# Patient Record
Sex: FEMALE | Race: WHITE | Employment: OTHER | URBAN - METROPOLITAN AREA
[De-identification: names, ages, dates, MRNs, and addresses within clinical notes are randomized per-mention and may not be internally consistent; named-entity substitution may affect disease eponyms.]

---

## 2017-04-12 ENCOUNTER — HOSPITAL ENCOUNTER (OUTPATIENT)
Dept: WOMENS IMAGING | Age: 72
Discharge: HOME | End: 2017-04-12
Payer: MEDICARE

## 2017-04-12 DIAGNOSIS — Z13.9 SCREENING: ICD-10-CM

## 2017-04-12 PROCEDURE — 77063 BREAST TOMOSYNTHESIS BI: CPT

## 2017-12-19 ENCOUNTER — HOSPITAL ENCOUNTER (OUTPATIENT)
Age: 72
Setting detail: SPECIMEN
Discharge: HOME | End: 2017-12-19
Payer: MEDICARE

## 2017-12-26 LAB — SURGICAL PATHOLOGY REPORT: NORMAL

## 2018-07-06 ENCOUNTER — HOSPITAL ENCOUNTER (OUTPATIENT)
Dept: WOMENS IMAGING | Age: 73
Discharge: HOME | End: 2018-07-08
Payer: MEDICARE

## 2018-07-06 DIAGNOSIS — Z78.0 POST-MENOPAUSAL: ICD-10-CM

## 2018-07-06 DIAGNOSIS — M81.0 OSTEOPOROSIS, UNSPECIFIED OSTEOPOROSIS TYPE, UNSPECIFIED PATHOLOGICAL FRACTURE PRESENCE: ICD-10-CM

## 2018-07-06 DIAGNOSIS — Z13.9 ENCOUNTER FOR SCREENING: ICD-10-CM

## 2018-07-06 PROCEDURE — 77080 DXA BONE DENSITY AXIAL: CPT

## 2018-07-06 PROCEDURE — 77063 BREAST TOMOSYNTHESIS BI: CPT

## 2019-05-22 ENCOUNTER — OFFICE VISIT (OUTPATIENT)
Dept: ORTHOPEDIC SURGERY | Age: 74
End: 2019-05-22
Payer: MEDICARE

## 2019-05-22 DIAGNOSIS — G89.29 CHRONIC PAIN OF RIGHT KNEE: Primary | ICD-10-CM

## 2019-05-22 DIAGNOSIS — M25.561 CHRONIC PAIN OF RIGHT KNEE: Primary | ICD-10-CM

## 2019-05-22 PROCEDURE — G8421 BMI NOT CALCULATED: HCPCS | Performed by: ORTHOPAEDIC SURGERY

## 2019-05-22 PROCEDURE — 1036F TOBACCO NON-USER: CPT | Performed by: ORTHOPAEDIC SURGERY

## 2019-05-22 PROCEDURE — G8427 DOCREV CUR MEDS BY ELIG CLIN: HCPCS | Performed by: ORTHOPAEDIC SURGERY

## 2019-05-22 PROCEDURE — 20610 DRAIN/INJ JOINT/BURSA W/O US: CPT | Performed by: ORTHOPAEDIC SURGERY

## 2019-05-22 PROCEDURE — 1123F ACP DISCUSS/DSCN MKR DOCD: CPT | Performed by: ORTHOPAEDIC SURGERY

## 2019-05-22 PROCEDURE — 4040F PNEUMOC VAC/ADMIN/RCVD: CPT | Performed by: ORTHOPAEDIC SURGERY

## 2019-05-22 PROCEDURE — 99213 OFFICE O/P EST LOW 20 MIN: CPT | Performed by: ORTHOPAEDIC SURGERY

## 2019-05-22 PROCEDURE — G8399 PT W/DXA RESULTS DOCUMENT: HCPCS | Performed by: ORTHOPAEDIC SURGERY

## 2019-05-22 PROCEDURE — 1090F PRES/ABSN URINE INCON ASSESS: CPT | Performed by: ORTHOPAEDIC SURGERY

## 2019-05-22 RX ORDER — LIDOCAINE HYDROCHLORIDE 10 MG/ML
5 INJECTION, SOLUTION INFILTRATION; PERINEURAL ONCE
Status: COMPLETED | OUTPATIENT
Start: 2019-05-22 | End: 2019-05-22

## 2019-05-22 RX ORDER — COVID-19 ANTIGEN TEST
KIT MISCELLANEOUS PRN
Status: ON HOLD | COMMUNITY
End: 2020-09-15 | Stop reason: HOSPADM

## 2019-05-22 RX ORDER — BUPIVACAINE HYDROCHLORIDE 5 MG/ML
2 INJECTION, SOLUTION PERINEURAL ONCE
Status: COMPLETED | OUTPATIENT
Start: 2019-05-22 | End: 2019-05-22

## 2019-05-22 RX ADMIN — BUPIVACAINE HYDROCHLORIDE 10 MG: 5 INJECTION, SOLUTION PERINEURAL at 15:54

## 2019-05-22 RX ADMIN — LIDOCAINE HYDROCHLORIDE 5 ML: 10 INJECTION, SOLUTION INFILTRATION; PERINEURAL at 15:55

## 2019-05-22 NOTE — PROGRESS NOTES
Estefani Andrews M.D.            [address] [facility] [address] [facility], [address] [facility]             Dept Phone: [phone]             Dept Fax:  [phone]  Gerald Son returns today. She is status post left total knee. She says her left knee is doing great she states her right knee is really beginning to bother her. She's had injections in the past.  She states that she ordinarily be ready do an arthroplasty and this site but she is heading the [facility] soon to see her sister who was recently diagnosed with cancer. She would prefer to put off her arthroplasty to later time. Examination patient's left total knee notes her motion is 0-1 35. She has excellent stability throughout excellent tracking throughout    Examination of her right knee notes about a 5° flexion contracture. Flexion to about 120. Crepitus and grinding medially as well as patella femoral joint ligamentously she is grossly intact. XR taken today:  Xr Knee Right (1-2 Views)    Result Date: 5/22/2019  X-rays taken today reviewed by me show standing AP of both knees and a lateral the right knee. Patient's left total knee looks in excellent position. Her right total knee has bone-on-bone apposition as well as severe patellofemoral narrowing     Impression  Status post left total knee  Moderate to severe DJD right knee    Plan  Per patient's request the right knee was injected with lidocaine superficially and then Khushi Britton to follow. She tolerated procedure well. We'll see her back here in 4 months. Review of Systems   Constitutional: Negative. HENT: Negative. Respiratory: Negative. Cardiovascular: Negative. Neurological: Negative. Musculoskeletal:   No chief complaint on file.           Current Outpatient Medications:     oxyCODONE-acetaminophen (PERCOCET) 5-325 MG per tablet, Take 1-2 tablets by mouth every 4 hours as needed for Pain (every 4-6 hours), Disp: 40 tablet, Rfl: 0    amoxicillin (AMOXIL) 500 MG capsule, 2 capsules one hour prior to dental or procedure flakito't then one cap twice daily until gone, Disp: 6 capsule, Rfl: 3    oxyCODONE-acetaminophen (PERCOCET) 5-325 MG per tablet, Take 1-2 tablets by mouth every 4 hours as needed for Pain (every 4-6 hours), Disp: 40 tablet, Rfl: 0    simvastatin (ZOCOR) 40 MG tablet, Take 40 mg by mouth nightly., Disp: , Rfl:     celecoxib (CELEBREX) 200 MG capsule, Take 200 mg by mouth 2 times daily. , Disp: , Rfl:     alendronate (FOSAMAX) 70 MG tablet, Take 70 mg by mouth every 7 days. , Disp: , Rfl:     No Known Allergies    Social History     Socioeconomic History    Marital status:      Spouse name: Not on file    Number of children: Not on file    Years of education: Not on file    Highest education level: Not on file   Occupational History    Not on file   Social Needs    Financial resource strain: Not on file    Food insecurity:     Worry: Not on file     Inability: Not on file    Transportation needs:     Medical: Not on file     Non-medical: Not on file   Tobacco Use    Smoking status: Never Smoker    Smokeless tobacco: Never Used   Substance and Sexual Activity    Alcohol use: Yes     Comment: Social    Drug use: No    Sexual activity: Not on file   Lifestyle    Physical activity:     Days per week: Not on file     Minutes per session: Not on file    Stress: Not on file   Relationships    Social connections:     Talks on phone: Not on file     Gets together: Not on file     Attends [employer] service: Not on file     Active member of club or organization: Not on file     Attends meetings of clubs or organizations: Not on file     Relationship status: Not on file    Intimate partner violence:     Fear of current or ex partner: Not on file     Emotionally abused: Not on file     Physically abused: Not on file     Forced sexual activity: Not on file   Other Topics Concern    Not on file   Social History Narrative    Not on file       Past Medical History:   Diagnosis Date    Arthritis     Hyperlipidemia      Past Surgical History:   Procedure Laterality Date    PRE-MALIGNANT / BENIGN SKIN LESION EXCISION Right 11/16/2015    Rt cheek cyst with local flap closure    TOTAL KNEE ARTHROPLASTY       Family History   Problem Relation Age of Onset    Heart Attack Mother            Orders Placed This Encounter   Procedures    XR KNEE RIGHT (1-2 VIEWS)     Standing Status:   Future     Number of Occurrences:   1     Standing Expiration Date:   5/22/2020       1. Chronic pain of right knee            Izzy Painting MD    Please note that this chart was generated using voice recognition Dragon dictation software. Although every effort was made to ensure the accuracy of this automated transcription, some errors in transcription may have occurred.

## 2019-07-08 ENCOUNTER — HOSPITAL ENCOUNTER (OUTPATIENT)
Dept: WOMENS IMAGING | Age: 74
Discharge: HOME | End: 2019-07-10
Payer: MEDICARE

## 2019-07-08 DIAGNOSIS — Z12.31 SCREENING MAMMOGRAM, ENCOUNTER FOR: ICD-10-CM

## 2019-07-08 PROCEDURE — 77063 BREAST TOMOSYNTHESIS BI: CPT

## 2019-10-30 ENCOUNTER — OFFICE VISIT (OUTPATIENT)
Dept: ORTHOPEDIC SURGERY | Age: 74
End: 2019-10-30
Payer: MEDICARE

## 2019-10-30 DIAGNOSIS — G89.29 CHRONIC PAIN OF RIGHT KNEE: Primary | ICD-10-CM

## 2019-10-30 DIAGNOSIS — M25.561 CHRONIC PAIN OF RIGHT KNEE: Primary | ICD-10-CM

## 2019-10-30 PROCEDURE — G8399 PT W/DXA RESULTS DOCUMENT: HCPCS | Performed by: ORTHOPAEDIC SURGERY

## 2019-10-30 PROCEDURE — 1090F PRES/ABSN URINE INCON ASSESS: CPT | Performed by: ORTHOPAEDIC SURGERY

## 2019-10-30 PROCEDURE — 4040F PNEUMOC VAC/ADMIN/RCVD: CPT | Performed by: ORTHOPAEDIC SURGERY

## 2019-10-30 PROCEDURE — G8421 BMI NOT CALCULATED: HCPCS | Performed by: ORTHOPAEDIC SURGERY

## 2019-10-30 PROCEDURE — 1123F ACP DISCUSS/DSCN MKR DOCD: CPT | Performed by: ORTHOPAEDIC SURGERY

## 2019-10-30 PROCEDURE — 99213 OFFICE O/P EST LOW 20 MIN: CPT | Performed by: ORTHOPAEDIC SURGERY

## 2019-10-30 PROCEDURE — G8427 DOCREV CUR MEDS BY ELIG CLIN: HCPCS | Performed by: ORTHOPAEDIC SURGERY

## 2019-10-30 PROCEDURE — G8484 FLU IMMUNIZE NO ADMIN: HCPCS | Performed by: ORTHOPAEDIC SURGERY

## 2019-10-30 PROCEDURE — 1036F TOBACCO NON-USER: CPT | Performed by: ORTHOPAEDIC SURGERY

## 2019-11-05 ENCOUNTER — TELEPHONE (OUTPATIENT)
Dept: ORTHOPEDIC SURGERY | Age: 74
End: 2019-11-05

## 2019-11-13 ENCOUNTER — OFFICE VISIT (OUTPATIENT)
Dept: ORTHOPEDIC SURGERY | Age: 74
End: 2019-11-13
Payer: MEDICARE

## 2019-11-13 DIAGNOSIS — M17.11 PRIMARY OSTEOARTHRITIS OF RIGHT KNEE: Primary | ICD-10-CM

## 2019-11-13 DIAGNOSIS — G89.29 CHRONIC PAIN OF RIGHT KNEE: ICD-10-CM

## 2019-11-13 DIAGNOSIS — M25.561 CHRONIC PAIN OF RIGHT KNEE: ICD-10-CM

## 2019-11-13 PROCEDURE — 20610 DRAIN/INJ JOINT/BURSA W/O US: CPT | Performed by: PHYSICIAN ASSISTANT

## 2019-11-13 PROCEDURE — 99024 POSTOP FOLLOW-UP VISIT: CPT | Performed by: PHYSICIAN ASSISTANT

## 2019-11-13 RX ORDER — LIDOCAINE HYDROCHLORIDE 10 MG/ML
2 INJECTION, SOLUTION EPIDURAL; INFILTRATION; INTRACAUDAL; PERINEURAL ONCE
Status: COMPLETED | OUTPATIENT
Start: 2019-11-13 | End: 2019-11-13

## 2019-11-13 RX ORDER — LIDOCAINE HYDROCHLORIDE 10 MG/ML
4 INJECTION, SOLUTION EPIDURAL; INFILTRATION; INTRACAUDAL; PERINEURAL ONCE
Status: CANCELLED | OUTPATIENT
Start: 2019-11-13 | End: 2019-11-13

## 2019-11-13 RX ADMIN — LIDOCAINE HYDROCHLORIDE 2 ML: 10 INJECTION, SOLUTION EPIDURAL; INFILTRATION; INTRACAUDAL; PERINEURAL at 15:54

## 2019-11-13 ASSESSMENT — ENCOUNTER SYMPTOMS
NAUSEA: 0
COLOR CHANGE: 0
RHINORRHEA: 0
COUGH: 0
VOMITING: 0

## 2020-07-06 ENCOUNTER — OFFICE VISIT (OUTPATIENT)
Dept: ORTHOPEDIC SURGERY | Age: 75
End: 2020-07-06
Payer: MEDICARE

## 2020-07-06 PROCEDURE — 4040F PNEUMOC VAC/ADMIN/RCVD: CPT | Performed by: ORTHOPAEDIC SURGERY

## 2020-07-06 PROCEDURE — 1123F ACP DISCUSS/DSCN MKR DOCD: CPT | Performed by: ORTHOPAEDIC SURGERY

## 2020-07-06 PROCEDURE — 1090F PRES/ABSN URINE INCON ASSESS: CPT | Performed by: ORTHOPAEDIC SURGERY

## 2020-07-06 PROCEDURE — G8428 CUR MEDS NOT DOCUMENT: HCPCS | Performed by: ORTHOPAEDIC SURGERY

## 2020-07-06 PROCEDURE — G8399 PT W/DXA RESULTS DOCUMENT: HCPCS | Performed by: ORTHOPAEDIC SURGERY

## 2020-07-06 PROCEDURE — 1036F TOBACCO NON-USER: CPT | Performed by: ORTHOPAEDIC SURGERY

## 2020-07-06 PROCEDURE — 99213 OFFICE O/P EST LOW 20 MIN: CPT | Performed by: ORTHOPAEDIC SURGERY

## 2020-07-06 PROCEDURE — G8421 BMI NOT CALCULATED: HCPCS | Performed by: ORTHOPAEDIC SURGERY

## 2020-07-06 NOTE — PROGRESS NOTES
Chloe Martin M.D.            [address] [facility] [facility],[address], [facility] [address].           Dept Phone: [phone]           Dept Fax:  [address]           [place]          Dept Phone: [phone]           Dept Fax:  [phone]      Chief Compliant:  Chief Complaint   Patient presents with    Pain     Rt knee        History of Present Illness:  Lieutenant Li returns today. This is a 66 y.o. female who presents to the clinic today for follow up of right knee pain. Her left TKA is doing well since 2015. Patient had Choco Chase series injections with her last on 11/13/19 and 5/22/19 prior to that. She reports that about a month ago she had a flare up of pain with locking and catching. She reports that she had sharp shooting pain that stiffened up her knee upon onset. She notes when it flares she is immobilized for a couple hours. She reports that if she sits for a long time she is very stiff to get up and causes pain. She has severe degenerative joint disease of her right knee mainly to the medial compartment with spur formation. Review of Systems   Constitutional: Negative for fever, chills, sweats, recent illness, or recent injury. Neurological: Negative for headaches, numbness, or weakness. Integumentary: Negative for rash, itching, ecchymosis, abrasions, or laceration. Musculoskeletal: Positive for Pain (Rt knee)       Physical Exam:  Constitutional: Patient is oriented to person, place, and time. Patient appears well-developed and well nourished. HENT: Negative otherwise noted  Head: Normocephalic and Atraumatic  Nose: Normal  Eyes: Conjunctivae and EOM are normal  Neck: Normal range of motion Neck supple. Respiratory/Cardio: Effort normal. No respiratory distress. Musculoskeletal:  Right knee: slight effusion, varus disposition.  2-120 procedure flakito't then one cap twice daily until gone, Disp: 6 capsule, Rfl: 3    simvastatin (ZOCOR) 40 MG tablet, Take 40 mg by mouth nightly., Disp: , Rfl:     alendronate (FOSAMAX) 70 MG tablet, Take 70 mg by mouth every 7 days. , Disp: , Rfl:   No Known Allergies  Social History     Socioeconomic History    Marital status:      Spouse name: Not on file    Number of children: Not on file    Years of education: Not on file    Highest education level: Not on file   Occupational History    Not on file   Social Needs    Financial resource strain: Not on file    Food insecurity     Worry: Not on file     Inability: Not on file    Transportation needs     Medical: Not on file     Non-medical: Not on file   Tobacco Use    Smoking status: Never Smoker    Smokeless tobacco: Never Used   Substance and Sexual Activity    Alcohol use: Yes     Comment: Social    Drug use: No    Sexual activity: Not on file   Lifestyle    Physical activity     Days per week: Not on file     Minutes per session: Not on file    Stress: Not on file   Relationships    Social connections     Talks on phone: Not on file     Gets together: Not on file     Attends Pentecostalism service: Not on file     Active member of club or organization: Not on file     Attends meetings of clubs or organizations: Not on file     Relationship status: Not on file    Intimate partner violence     Fear of current or ex partner: Not on file     Emotionally abused: Not on file     Physically abused: Not on file     Forced sexual activity: Not on file   Other Topics Concern    Not on file   Social History Narrative    Not on file     Past Medical History:   Diagnosis Date    Arthritis     Hyperlipidemia      Past Surgical History:   Procedure Laterality Date    PRE-MALIGNANT / BENIGN SKIN LESION EXCISION Right 11/16/2015    Rt cheek cyst with local flap closure    TOTAL KNEE ARTHROPLASTY       Family History   Problem Relation Age of Onset    Heart Attack Mother           Scribe Attestation:  By signing my name below, I, Larissa Neli, attest that this documentation has been prepared under the direction and in the presence of Dr. Ronny Tripathi. Electronically signed: Rupali Arciniega, 7/6/20     Please note that this chart was generated using voice recognition Dragon dictation software. Although every effort was made to ensure the accuracy of this automated transcription, some errors in transcription may have occurred.

## 2020-08-27 ASSESSMENT — PROMIS GLOBAL HEALTH SCALE
IN GENERAL, HOW WOULD YOU RATE YOUR PHYSICAL HEALTH [ON A SCALE OF 1 (POOR) TO 5 (EXCELLENT)]?: 3
TO WHAT EXTENT ARE YOU ABLE TO CARRY OUT YOUR EVERYDAY PHYSICAL ACTIVITIES SUCH AS WALKING, CLIMBING STAIRS, CARRYING GROCERIES, OR MOVING A CHAIR [ON A SCALE OF 1 (NOT AT ALL) TO 5 (COMPLETELY)]?: 3
IN GENERAL, PLEASE RATE HOW WELL YOU CARRY OUT YOUR USUAL SOCIAL ACTIVITIES (INCLUDES ACTIVITIES AT HOME, AT WORK, AND IN YOUR COMMUNITY, AND RESPONSIBILITIES AS A PARENT, CHILD, SPOUSE, EMPLOYEE, FRIEND, ETC) [ON A SCALE OF 1 (POOR) TO 5 (EXCELLENT)]?: 4
IN THE PAST 7 DAYS, HOW WOULD YOU RATE YOUR FATIGUE ON AVERAGE [ON A SCALE FROM 1 (NONE) TO 5 (VERY SEVERE)]?: 4
IN THE PAST 7 DAYS, HOW OFTEN HAVE YOU BEEN BOTHERED BY EMOTIONAL PROBLEMS, SUCH AS FEELING ANXIOUS, DEPRESSED, OR IRRITABLE [ON A SCALE FROM 1 (NEVER) TO 5 (ALWAYS)]?: 2
IN GENERAL, HOW WOULD YOU RATE YOUR SATISFACTION WITH YOUR SOCIAL ACTIVITIES AND RELATIONSHIPS [ON A SCALE OF 1 (POOR) TO 5 (EXCELLENT)]?: 4
IN THE PAST 7 DAYS, HOW WOULD YOU RATE YOUR PAIN ON AVERAGE [ON A SCALE FROM 0 (NO PAIN) TO 10 (WORST IMAGINABLE PAIN)]?: 5
IN GENERAL, HOW WOULD YOU RATE YOUR MENTAL HEALTH, INCLUDING YOUR MOOD AND YOUR ABILITY TO THINK [ON A SCALE OF 1 (POOR) TO 5 (EXCELLENT)]?: 3
HOW IS THE PROMIS V1.1 BEING ADMINISTERED?: 2
IN GENERAL, WOULD YOU SAY YOUR HEALTH IS...[ON A SCALE OF 1 (POOR) TO 5 (EXCELLENT)]: 4
IN GENERAL, WOULD YOU SAY YOUR QUALITY OF LIFE IS...[ON A SCALE OF 1 (POOR) TO 5 (EXCELLENT)]: 4
WHO IS THE PERSON COMPLETING THE PROMIS V1.1 SURVEY?: 0

## 2020-08-27 ASSESSMENT — KOOS JR
STANDING UPRIGHT: 1
RISING FROM SITTING: 2
TWISING OR PIVOTING ON KNEE: 2
HOW SEVERE IS YOUR KNEE STIFFNESS AFTER FIRST WAKING IN MORNING: 3
STRAIGHTENING KNEE FULLY: 1
BENDING TO THE FLOOR TO PICK UP OBJECT: 2
GOING UP OR DOWN STAIRS: 3

## 2020-09-01 ENCOUNTER — HOSPITAL ENCOUNTER (OUTPATIENT)
Dept: PREADMISSION TESTING | Age: 75
Discharge: HOME | End: 2020-09-05
Payer: MEDICARE

## 2020-09-01 VITALS
HEART RATE: 74 BPM | TEMPERATURE: 98.4 F | SYSTOLIC BLOOD PRESSURE: 148 MMHG | WEIGHT: 220 LBS | OXYGEN SATURATION: 95 % | DIASTOLIC BLOOD PRESSURE: 76 MMHG | HEIGHT: 62 IN | RESPIRATION RATE: 16 BRPM | BODY MASS INDEX: 40.48 KG/M2

## 2020-09-01 LAB
ABSOLUTE EOS #: 0.2 K/UL (ref 0–0.4)
ABSOLUTE IMMATURE GRANULOCYTE: ABNORMAL K/UL (ref 0–0.3)
ABSOLUTE LYMPH #: 1.8 K/UL (ref 1–4.8)
ABSOLUTE MONO #: 0.6 K/UL (ref 0.1–1.3)
ANION GAP SERPL CALCULATED.3IONS-SCNC: 8 MMOL/L (ref 9–17)
BASOPHILS # BLD: 1 % (ref 0–2)
BASOPHILS ABSOLUTE: 0 K/UL (ref 0–0.2)
BILIRUBIN URINE: NEGATIVE
BUN BLDV-MCNC: 16 MG/DL (ref 8–23)
BUN/CREAT BLD: ABNORMAL (ref 9–20)
CALCIUM SERPL-MCNC: 8.8 MG/DL (ref 8.6–10.4)
CHLORIDE BLD-SCNC: 105 MMOL/L (ref 98–107)
CO2: 29 MMOL/L (ref 20–31)
COLOR: YELLOW
COMMENT UA: NORMAL
CREAT SERPL-MCNC: 0.55 MG/DL (ref 0.5–0.9)
DIFFERENTIAL TYPE: ABNORMAL
EOSINOPHILS RELATIVE PERCENT: 3 % (ref 0–4)
GFR AFRICAN AMERICAN: >60 ML/MIN
GFR NON-AFRICAN AMERICAN: >60 ML/MIN
GFR SERPL CREATININE-BSD FRML MDRD: ABNORMAL ML/MIN/{1.73_M2}
GFR SERPL CREATININE-BSD FRML MDRD: ABNORMAL ML/MIN/{1.73_M2}
GLUCOSE BLD-MCNC: 145 MG/DL (ref 70–99)
GLUCOSE URINE: NEGATIVE
HCT VFR BLD CALC: 41.7 % (ref 36–46)
HEMOGLOBIN: 14.2 G/DL (ref 12–16)
IMMATURE GRANULOCYTES: ABNORMAL %
KETONES, URINE: NEGATIVE
LEUKOCYTE ESTERASE, URINE: NEGATIVE
LYMPHOCYTES # BLD: 22 % (ref 24–44)
MCH RBC QN AUTO: 31.6 PG (ref 26–34)
MCHC RBC AUTO-ENTMCNC: 34 G/DL (ref 31–37)
MCV RBC AUTO: 93 FL (ref 80–100)
MONOCYTES # BLD: 7 % (ref 1–7)
NITRITE, URINE: NEGATIVE
NRBC AUTOMATED: ABNORMAL PER 100 WBC
PDW BLD-RTO: 14 % (ref 11.5–14.9)
PH UA: 7 (ref 5–8)
PLATELET # BLD: 303 K/UL (ref 150–450)
PLATELET ESTIMATE: ABNORMAL
PMV BLD AUTO: 8.5 FL (ref 6–12)
POTASSIUM SERPL-SCNC: 4.4 MMOL/L (ref 3.7–5.3)
PROTEIN UA: NEGATIVE
RBC # BLD: 4.48 M/UL (ref 4–5.2)
RBC # BLD: ABNORMAL 10*6/UL
SEG NEUTROPHILS: 67 % (ref 36–66)
SEGMENTED NEUTROPHILS ABSOLUTE COUNT: 5.6 K/UL (ref 1.3–9.1)
SODIUM BLD-SCNC: 142 MMOL/L (ref 135–144)
SPECIFIC GRAVITY UA: 1.01 (ref 1–1.03)
TURBIDITY: CLEAR
URINE HGB: NEGATIVE
UROBILINOGEN, URINE: NORMAL
WBC # BLD: 8.2 K/UL (ref 3.5–11)
WBC # BLD: ABNORMAL 10*3/UL

## 2020-09-01 PROCEDURE — 36415 COLL VENOUS BLD VENIPUNCTURE: CPT

## 2020-09-01 PROCEDURE — 93005 ELECTROCARDIOGRAM TRACING: CPT | Performed by: ANESTHESIOLOGY

## 2020-09-01 PROCEDURE — 85025 COMPLETE CBC W/AUTO DIFF WBC: CPT

## 2020-09-01 PROCEDURE — 80048 BASIC METABOLIC PNL TOTAL CA: CPT

## 2020-09-01 PROCEDURE — 81003 URINALYSIS AUTO W/O SCOPE: CPT

## 2020-09-01 PROCEDURE — 87641 MR-STAPH DNA AMP PROBE: CPT

## 2020-09-01 RX ORDER — LORAZEPAM 0.5 MG/1
0.5 TABLET ORAL DAILY PRN
Status: ON HOLD | COMMUNITY
End: 2020-09-15

## 2020-09-01 NOTE — H&P (VIEW-ONLY)
HISTORY and [facility]       NAME:  Venancio Goodell  MRN: [id number]   YOB: 1945   Date: 9/1/2020   Age: 66 y.o. Gender: female       COMPLAINT AND PRESENT HISTORY:     Venancio Goodell is 66 y.o.,  female, undergoing preadmission testing for KNEE TOTAL ARTHROPLASTY Right. Pt has history of DEGENERATIVE JOINT DISEASE    Patient C/O of  Sharp shooting pain swelling, stiffness, popping and cracking in the right Knee. Pt describes the pain as 7/10 in intensity at times. Symptoms started two month ago. the pain increase with standing up for long time and walking for long time. The pain decrease with sitting down. She reports that if she sits for a long time she is very stiff to get up and causes pain. Patient had Norlin Felling series injections with her last on 11/13/19 and 5/22/19 prior to that. The knee does not buckle, give way under the patient. No locking up, No recent falls or trauma. No redness, No Hx of MRSA infections in the past.  Pt denies any problems with anesthesia . Pt denies chest pain, no SOB, no fever/chills. Tenderness on palpation of the Rt  Knee joint space worse on the medial  aspect. Rt Knee Arthroscopy    XR KNEE RIGHT (1-2 VIEWS)   Narrative    X-rays taken today reviewed by me show standing AP of both knees and a    lateral of the right knee.  Patient is status post left total knee    components appear to be excellent.  Patient's right knee notes    bone-on-bone disease the entire medial compartment of the right knee with    overall varus disposition.  Her lateral view indicates spurs in the    patellofemoral joint as well as posteriorly.  She is also beginning to    groove into the tibial plateau as seen on the lateral view.           PAST MEDICAL HISTORY     Past Medical History:   Diagnosis Date    Arthritis     Hyperlipidemia     Wears glasses          SURGICAL HISTORY       Past Surgical History:   Procedure Laterality Date    COLONOSCOPY  PRE-MALIGNANT / BENIGN SKIN LESION EXCISION Right 11/16/2015    Rt cheek cyst with local flap closure    TOTAL KNEE ARTHROPLASTY Left 2015       FAMILY HISTORY       Family History   Problem Relation Age of Onset    Heart Attack Mother        SOCIAL HISTORY       Social History     Socioeconomic History    Marital status:      Spouse name: None    Number of children: None    Years of education: None    Highest education level: None   Occupational History    None   Social Needs    Financial resource strain: None    Food insecurity     Worry: None     Inability: None    Transportation needs     Medical: None     Non-medical: None   Tobacco Use    Smoking status: Never Smoker    Smokeless tobacco: Never Used   Substance and Sexual Activity    Alcohol use: Yes     Comment: Social    Drug use: No    Sexual activity: None   Lifestyle    Physical activity     Days per week: None     Minutes per session: None    Stress: None   Relationships    Social connections     Talks on phone: None     Gets together: None     Attends Holiness service: None     Active member of club or organization: None     Attends meetings of clubs or organizations: None     Relationship status: None    Intimate partner violence     Fear of current or ex partner: None     Emotionally abused: None     Physically abused: None     Forced sexual activity: None   Other Topics Concern    None   Social History Narrative    None           REVIEW OF SYSTEMS      No Known Allergies    Current Outpatient Medications on File Prior to Encounter   Medication Sig Dispense Refill    LORazepam (ATIVAN) 0.5 MG tablet Take 0.5 mg by mouth daily as needed for Anxiety.       Naproxen Sodium (ALEVE) 220 MG CAPS Take by mouth as needed       amoxicillin (AMOXIL) 500 MG capsule 2 capsules one hour prior to dental or procedure flakito't then one cap twice daily until gone 6 capsule 3    simvastatin (ZOCOR) 40 MG tablet Take 40 mg by mouth nightly.  alendronate (FOSAMAX) 70 MG tablet Take 70 mg by mouth every 7 days. No current facility-administered medications on file prior to encounter. General health:  Fairly good. No fever or chills. Skin:  No itching, redness or rash. HEENT:  No headache, epistaxis or sore throat. Neck:  No pain, stiffness or masses. Cardiovascular/Respiratory system:  No chest pain, palpitation or shortness of breath. Gastrointestinal tract: No abdominal pain, Dysphagia, nausea, vomiting, diarrhea or constipation. Genitourinary:  No burning on micturition. No hesitancy, urgency, frequency or discoloration of urine. Locomotor:  Positive right knee pain , with  swelling. Neuropsychiatric:  No referable complaints. See HPI. GENERAL PHYSICAL EXAM:     Vitals: BP (!) 148/76   Pulse 74   Temp 98.4 °F (36.9 °C)   Resp 16   Ht 5' 2\" (1.575 m)   Wt 220 lb (99.8 kg)   SpO2 95%   BMI 40.24 kg/m²  Body mass index is 40.24 kg/m². GENERAL APPEARANCE:   Mira Cheadle is 66 y.o.,  female, moderately obese, nourished, conscious, alert. Does not appear to be distress or pain at this time. SKIN:  Warm, dry, no cyanosis or jaundice. HEAD:  Normocephalic, atraumatic, no swelling or tenderness. EYES:  Pupils equal, reactive to light. EARS:  No discharge, no marked hearing loss. NOSE:  No rhinorrhea, epistaxis or septal deformity. THROAT:  Not congested. No ulceration bleeding or discharge. NECK:  No stiffness, trachea central.                  CHEST:  Symmetrical and equal on expansion. HEART:  RRR S1 > S2. No audible murmurs or gallops. LUNGS:  Equal on expansion, normal breath sounds. No adventitious sounds. ABDOMEN:  Obese. Soft on palpation. No localized tenderness. No guarding or rigidity. No palpable hepatosplenomegaly. LYMPHATICS:  No palpable cervical lymphadenopathy. LOCOMOTOR, BACK AND SPINE:  No tenderness or deformities. EXTREMITIES:  Symmetrical, no pretibial edema. Irinas sign negative or no calf tenderness. No discoloration or ulcerations. Tenderness on palpation of the Rt  Knee joint space worse on the medial aspect. There is slight effusion and limited ROM. NEUROLOGIC:  The patient is conscious, alert, oriented,Cranial nerve II-XII intact, taste and smell were not examined. No apparent focal sensory or motor deficits.                                                                                      PROVISIONAL DIAGNOSES / SURGERY:    DEGENERATIVE JOINT DISEASE  KNEE TOTAL ARTHROPLASTY Right     Patient Active Problem List    Diagnosis Date Noted    Sebaceous cyst 12/16/2015           FERNANDA Covarrubias - CNP on 9/1/2020 at 1:39 PM

## 2020-09-01 NOTE — H&P
HISTORY and [facility]       NAME:  Eneida Westfall  MRN: [id number]   YOB: 1945   Date: 9/1/2020   Age: 66 y.o. Gender: female       COMPLAINT AND PRESENT HISTORY:     Eneida Westfall is 66 y.o.,  female, undergoing preadmission testing for KNEE TOTAL ARTHROPLASTY Right. Pt has history of DEGENERATIVE JOINT DISEASE    Patient C/O of  Sharp shooting pain swelling, stiffness, popping and cracking in the right Knee. Pt describes the pain as 7/10 in intensity at times. Symptoms started two month ago. the pain increase with standing up for long time and walking for long time. The pain decrease with sitting down. She reports that if she sits for a long time she is very stiff to get up and causes pain. Patient had Breann Codding series injections with her last on 11/13/19 and 5/22/19 prior to that. The knee does not buckle, give way under the patient. No locking up, No recent falls or trauma. No redness, No Hx of MRSA infections in the past.  Pt denies any problems with anesthesia . Pt denies chest pain, no SOB, no fever/chills. Tenderness on palpation of the Rt  Knee joint space worse on the medial  aspect. Rt Knee Arthroscopy    XR KNEE RIGHT (1-2 VIEWS)   Narrative    X-rays taken today reviewed by me show standing AP of both knees and a    lateral of the right knee.  Patient is status post left total knee    components appear to be excellent.  Patient's right knee notes    bone-on-bone disease the entire medial compartment of the right knee with    overall varus disposition.  Her lateral view indicates spurs in the    patellofemoral joint as well as posteriorly.  She is also beginning to    groove into the tibial plateau as seen on the lateral view.           PAST MEDICAL HISTORY     Past Medical History:   Diagnosis Date    Arthritis     Hyperlipidemia     Wears glasses          SURGICAL HISTORY       Past Surgical History:   Procedure Laterality Date    COLONOSCOPY  PRE-MALIGNANT / BENIGN SKIN LESION EXCISION Right 11/16/2015    Rt cheek cyst with local flap closure    TOTAL KNEE ARTHROPLASTY Left 2015       FAMILY HISTORY       Family History   Problem Relation Age of Onset    Heart Attack Mother        SOCIAL HISTORY       Social History     Socioeconomic History    Marital status:      Spouse name: None    Number of children: None    Years of education: None    Highest education level: None   Occupational History    None   Social Needs    Financial resource strain: None    Food insecurity     Worry: None     Inability: None    Transportation needs     Medical: None     Non-medical: None   Tobacco Use    Smoking status: Never Smoker    Smokeless tobacco: Never Used   Substance and Sexual Activity    Alcohol use: Yes     Comment: Social    Drug use: No    Sexual activity: None   Lifestyle    Physical activity     Days per week: None     Minutes per session: None    Stress: None   Relationships    Social connections     Talks on phone: None     Gets together: None     Attends [employer] service: None     Active member of club or organization: None     Attends meetings of clubs or organizations: None     Relationship status: None    Intimate partner violence     Fear of current or ex partner: None     Emotionally abused: None     Physically abused: None     Forced sexual activity: None   Other Topics Concern    None   Social History Narrative    None           REVIEW OF SYSTEMS      No Known Allergies    Current Outpatient Medications on File Prior to Encounter   Medication Sig Dispense Refill    LORazepam (ATIVAN) 0.5 MG tablet Take 0.5 mg by mouth daily as needed for Anxiety.       Naproxen Sodium (ALEVE) 220 MG CAPS Take by mouth as needed       amoxicillin (AMOXIL) 500 MG capsule 2 capsules one hour prior to dental or procedure flakito't then one cap twice daily until gone 6 capsule 3    simvastatin (ZOCOR) 40 MG tablet Take 40 mg by mouth nightly.  alendronate (FOSAMAX) 70 MG tablet Take 70 mg by mouth every 7 days. No current facility-administered medications on file prior to encounter. General health:  Fairly good. No fever or chills. Skin:  No itching, redness or rash. HEENT:  No headache, epistaxis or sore throat. Neck:  No pain, stiffness or masses. Cardiovascular/Respiratory system:  No chest pain, palpitation or shortness of breath. Gastrointestinal tract: No abdominal pain, Dysphagia, nausea, vomiting, diarrhea or constipation. Genitourinary:  No burning on micturition. No hesitancy, urgency, frequency or discoloration of urine. Locomotor:  Positive right knee pain , with  swelling. Neuropsychiatric:  No referable complaints. See HPI. GENERAL PHYSICAL EXAM:     Vitals: BP (!) 148/76   Pulse 74   Temp 98.4 °F (36.9 °C)   Resp 16   Ht 5' 2\" (1.575 m)   Wt 220 lb (99.8 kg)   SpO2 95%   BMI 40.24 kg/m²  Body mass index is 40.24 kg/m². GENERAL APPEARANCE:   Yahaira Machuca is 66 y.o.,  female, moderately obese, nourished, conscious, alert. Does not appear to be distress or pain at this time. SKIN:  Warm, dry, no cyanosis or jaundice. HEAD:  Normocephalic, atraumatic, no swelling or tenderness. EYES:  Pupils equal, reactive to light. EARS:  No discharge, no marked hearing loss. NOSE:  No rhinorrhea, epistaxis or septal deformity. THROAT:  Not congested. No ulceration bleeding or discharge. NECK:  No stiffness, trachea central.                  CHEST:  Symmetrical and equal on expansion. HEART:  RRR S1 > S2. No audible murmurs or gallops. LUNGS:  Equal on expansion, normal breath sounds. No adventitious sounds. ABDOMEN:  Obese. Soft on palpation. No localized tenderness. No guarding or rigidity. No palpable hepatosplenomegaly. LYMPHATICS:  No palpable cervical lymphadenopathy. LOCOMOTOR, BACK AND SPINE:  No tenderness or deformities. EXTREMITIES:  Symmetrical, no pretibial edema. Irinas sign negative or no calf tenderness. No discoloration or ulcerations. Tenderness on palpation of the Rt  Knee joint space worse on the medial aspect. There is slight effusion and limited ROM. NEUROLOGIC:  The patient is conscious, alert, oriented,Cranial nerve II-XII intact, taste and smell were not examined. No apparent focal sensory or motor deficits.                                                                                      PROVISIONAL DIAGNOSES / SURGERY:    DEGENERATIVE JOINT DISEASE  KNEE TOTAL ARTHROPLASTY Right     Patient Active Problem List    Diagnosis Date Noted    Sebaceous cyst 12/16/2015           FERNANDA Jose - CNP on 9/1/2020 at 1:39 PM

## 2020-09-01 NOTE — PROGRESS NOTES
Dr. Aranza Jesus, anesthesia, was contacted and informed of patient's history and planned surgery. Orders received and no clearance required.

## 2020-09-02 ENCOUNTER — ANESTHESIA EVENT (OUTPATIENT)
Dept: OPERATING ROOM | Age: 75
End: 2020-09-02
Payer: MEDICARE

## 2020-09-02 LAB
EKG ATRIAL RATE: 72 BPM
EKG P AXIS: 76 DEGREES
EKG P-R INTERVAL: 172 MS
EKG Q-T INTERVAL: 408 MS
EKG QRS DURATION: 84 MS
EKG QTC CALCULATION (BAZETT): 446 MS
EKG R AXIS: 56 DEGREES
EKG T AXIS: 46 DEGREES
EKG VENTRICULAR RATE: 72 BPM
MRSA, DNA, NASAL: NORMAL
SPECIMEN DESCRIPTION: NORMAL

## 2020-09-02 PROCEDURE — 93010 ELECTROCARDIOGRAM REPORT: CPT | Performed by: INTERNAL MEDICINE

## 2020-09-02 RX ORDER — SODIUM CHLORIDE 0.9 % (FLUSH) 0.9 %
10 SYRINGE (ML) INJECTION PRN
Status: CANCELLED | OUTPATIENT
Start: 2020-09-02

## 2020-09-02 RX ORDER — LIDOCAINE HYDROCHLORIDE 10 MG/ML
1 INJECTION, SOLUTION EPIDURAL; INFILTRATION; INTRACAUDAL; PERINEURAL
Status: CANCELLED | OUTPATIENT
Start: 2020-09-02 | End: 2020-09-02

## 2020-09-02 RX ORDER — SODIUM CHLORIDE 0.9 % (FLUSH) 0.9 %
10 SYRINGE (ML) INJECTION EVERY 12 HOURS SCHEDULED
Status: CANCELLED | OUTPATIENT
Start: 2020-09-02

## 2020-09-02 RX ORDER — SODIUM CHLORIDE, SODIUM LACTATE, POTASSIUM CHLORIDE, CALCIUM CHLORIDE 600; 310; 30; 20 MG/100ML; MG/100ML; MG/100ML; MG/100ML
INJECTION, SOLUTION INTRAVENOUS CONTINUOUS
Status: CANCELLED | OUTPATIENT
Start: 2020-09-02

## 2020-09-11 ENCOUNTER — HOSPITAL ENCOUNTER (OUTPATIENT)
Dept: PREADMISSION TESTING | Age: 75
Setting detail: SPECIMEN
Discharge: HOME | End: 2020-09-15
Payer: MEDICARE

## 2020-09-11 PROCEDURE — U0003 INFECTIOUS AGENT DETECTION BY NUCLEIC ACID (DNA OR RNA); SEVERE ACUTE RESPIRATORY SYNDROME CORONAVIRUS 2 (SARS-COV-2) (CORONAVIRUS DISEASE [COVID-19]), AMPLIFIED PROBE TECHNIQUE, MAKING USE OF HIGH THROUGHPUT TECHNOLOGIES AS DESCRIBED BY CMS-2020-01-R: HCPCS

## 2020-09-13 LAB — SARS-COV-2, NAA: NOT DETECTED

## 2020-09-14 ENCOUNTER — TELEPHONE (OUTPATIENT)
Dept: FAMILY MEDICINE CLINIC | Age: 75
End: 2020-09-14

## 2020-09-15 ENCOUNTER — HOSPITAL ENCOUNTER (OUTPATIENT)
Age: 75
Discharge: HOME | End: 2020-09-15
Attending: ORTHOPAEDIC SURGERY | Admitting: ORTHOPAEDIC SURGERY
Payer: MEDICARE

## 2020-09-15 ENCOUNTER — APPOINTMENT (OUTPATIENT)
Dept: GENERAL RADIOLOGY | Age: 75
End: 2020-09-15
Attending: ORTHOPAEDIC SURGERY
Payer: MEDICARE

## 2020-09-15 ENCOUNTER — ANESTHESIA (OUTPATIENT)
Dept: OPERATING ROOM | Age: 75
End: 2020-09-15
Payer: MEDICARE

## 2020-09-15 VITALS
DIASTOLIC BLOOD PRESSURE: 81 MMHG | HEIGHT: 62 IN | SYSTOLIC BLOOD PRESSURE: 146 MMHG | HEART RATE: 88 BPM | TEMPERATURE: 97.7 F | OXYGEN SATURATION: 94 % | WEIGHT: 220 LBS | RESPIRATION RATE: 16 BRPM | BODY MASS INDEX: 40.48 KG/M2

## 2020-09-15 VITALS — SYSTOLIC BLOOD PRESSURE: 211 MMHG | OXYGEN SATURATION: 100 % | TEMPERATURE: 97 F | DIASTOLIC BLOOD PRESSURE: 96 MMHG

## 2020-09-15 PROBLEM — M17.11 PRIMARY OSTEOARTHRITIS OF RIGHT KNEE: Status: ACTIVE | Noted: 2020-09-15

## 2020-09-15 PROCEDURE — 73560 X-RAY EXAM OF KNEE 1 OR 2: CPT

## 2020-09-15 PROCEDURE — 6360000002 HC RX W HCPCS: Performed by: ORTHOPAEDIC SURGERY

## 2020-09-15 PROCEDURE — 3600000013 HC SURGERY LEVEL 3 ADDTL 15MIN: Performed by: ORTHOPAEDIC SURGERY

## 2020-09-15 PROCEDURE — 2580000003 HC RX 258: Performed by: ORTHOPAEDIC SURGERY

## 2020-09-15 PROCEDURE — 3600000003 HC SURGERY LEVEL 3 BASE: Performed by: ORTHOPAEDIC SURGERY

## 2020-09-15 PROCEDURE — 3700000000 HC ANESTHESIA ATTENDED CARE: Performed by: ORTHOPAEDIC SURGERY

## 2020-09-15 PROCEDURE — 2500000003 HC RX 250 WO HCPCS: Performed by: ORTHOPAEDIC SURGERY

## 2020-09-15 PROCEDURE — 27447 TOTAL KNEE ARTHROPLASTY: CPT | Performed by: ORTHOPAEDIC SURGERY

## 2020-09-15 PROCEDURE — 7100000001 HC PACU RECOVERY - ADDTL 15 MIN: Performed by: ORTHOPAEDIC SURGERY

## 2020-09-15 PROCEDURE — 2580000003 HC RX 258: Performed by: ANESTHESIOLOGY

## 2020-09-15 PROCEDURE — C1713 ANCHOR/SCREW BN/BN,TIS/BN: HCPCS | Performed by: ORTHOPAEDIC SURGERY

## 2020-09-15 PROCEDURE — 2500000003 HC RX 250 WO HCPCS: Performed by: NURSE ANESTHETIST, CERTIFIED REGISTERED

## 2020-09-15 PROCEDURE — 6370000000 HC RX 637 (ALT 250 FOR IP): Performed by: ORTHOPAEDIC SURGERY

## 2020-09-15 PROCEDURE — 3700000001 HC ADD 15 MINUTES (ANESTHESIA): Performed by: ORTHOPAEDIC SURGERY

## 2020-09-15 PROCEDURE — 2720000010 HC SURG SUPPLY STERILE: Performed by: ORTHOPAEDIC SURGERY

## 2020-09-15 PROCEDURE — 2709999900 HC NON-CHARGEABLE SUPPLY: Performed by: ORTHOPAEDIC SURGERY

## 2020-09-15 PROCEDURE — 97530 THERAPEUTIC ACTIVITIES: CPT

## 2020-09-15 PROCEDURE — 6360000002 HC RX W HCPCS: Performed by: ANESTHESIOLOGY

## 2020-09-15 PROCEDURE — 97166 OT EVAL MOD COMPLEX 45 MIN: CPT

## 2020-09-15 PROCEDURE — 6360000002 HC RX W HCPCS: Performed by: NURSE ANESTHETIST, CERTIFIED REGISTERED

## 2020-09-15 PROCEDURE — 97116 GAIT TRAINING THERAPY: CPT

## 2020-09-15 PROCEDURE — 97110 THERAPEUTIC EXERCISES: CPT

## 2020-09-15 PROCEDURE — 97161 PT EVAL LOW COMPLEX 20 MIN: CPT

## 2020-09-15 PROCEDURE — 97535 SELF CARE MNGMENT TRAINING: CPT

## 2020-09-15 PROCEDURE — 7100000000 HC PACU RECOVERY - FIRST 15 MIN: Performed by: ORTHOPAEDIC SURGERY

## 2020-09-15 PROCEDURE — C1776 JOINT DEVICE (IMPLANTABLE): HCPCS | Performed by: ORTHOPAEDIC SURGERY

## 2020-09-15 DEVICE — IMPLANTABLE DEVICE: Type: IMPLANTABLE DEVICE | Site: KNEE | Status: FUNCTIONAL

## 2020-09-15 DEVICE — CEMENT BNE 40GM HI VISC RADPQ FOR REV SURG: Type: IMPLANTABLE DEVICE | Site: KNEE | Status: FUNCTIONAL

## 2020-09-15 DEVICE — TRAY TIB L71MM KNEE CO CHROM FIN MOD INTLOK VANGUARD: Type: IMPLANTABLE DEVICE | Site: KNEE | Status: FUNCTIONAL

## 2020-09-15 DEVICE — COMPONENT PAT DIA34MM THK7.8MM THN KNEE POLY 3 PEG SER A: Type: IMPLANTABLE DEVICE | Site: KNEE | Status: FUNCTIONAL

## 2020-09-15 RX ORDER — OXYCODONE HYDROCHLORIDE AND ACETAMINOPHEN 5; 325 MG/1; MG/1
1-2 TABLET ORAL EVERY 4 HOURS PRN
Qty: 42 TABLET | Refills: 0 | Status: SHIPPED | OUTPATIENT
Start: 2020-09-15 | End: 2020-09-24 | Stop reason: SDUPTHER

## 2020-09-15 RX ORDER — OXYCODONE HYDROCHLORIDE AND ACETAMINOPHEN 5; 325 MG/1; MG/1
1 TABLET ORAL
Status: DISCONTINUED | OUTPATIENT
Start: 2020-09-15 | End: 2020-09-15 | Stop reason: HOSPADM

## 2020-09-15 RX ORDER — SODIUM CHLORIDE 0.9 % (FLUSH) 0.9 %
10 SYRINGE (ML) INJECTION PRN
Status: DISCONTINUED | OUTPATIENT
Start: 2020-09-15 | End: 2020-09-15 | Stop reason: HOSPADM

## 2020-09-15 RX ORDER — MIDAZOLAM HYDROCHLORIDE 1 MG/ML
INJECTION INTRAMUSCULAR; INTRAVENOUS PRN
Status: DISCONTINUED | OUTPATIENT
Start: 2020-09-15 | End: 2020-09-15 | Stop reason: SDUPTHER

## 2020-09-15 RX ORDER — LIDOCAINE HYDROCHLORIDE 10 MG/ML
1 INJECTION, SOLUTION EPIDURAL; INFILTRATION; INTRACAUDAL; PERINEURAL
Status: DISCONTINUED | OUTPATIENT
Start: 2020-09-15 | End: 2020-09-15 | Stop reason: HOSPADM

## 2020-09-15 RX ORDER — ACETAMINOPHEN 325 MG/1
650 TABLET ORAL EVERY 6 HOURS
Status: DISCONTINUED | OUTPATIENT
Start: 2020-09-15 | End: 2020-09-15 | Stop reason: HOSPADM

## 2020-09-15 RX ORDER — ASPIRIN 81 MG/1
81 TABLET ORAL 2 TIMES DAILY
Status: DISCONTINUED | OUTPATIENT
Start: 2020-09-15 | End: 2020-09-15 | Stop reason: HOSPADM

## 2020-09-15 RX ORDER — ACETAMINOPHEN 500 MG
1000 TABLET ORAL ONCE
Status: COMPLETED | OUTPATIENT
Start: 2020-09-15 | End: 2020-09-15

## 2020-09-15 RX ORDER — ASPIRIN 81 MG/1
81 TABLET ORAL 2 TIMES DAILY
Qty: 60 TABLET | Refills: 1 | Status: SHIPPED | OUTPATIENT
Start: 2020-09-15

## 2020-09-15 RX ORDER — ESCITALOPRAM OXALATE 5 MG/1
5 TABLET ORAL DAILY
COMMUNITY

## 2020-09-15 RX ORDER — SCOLOPAMINE TRANSDERMAL SYSTEM 1 MG/1
1 PATCH, EXTENDED RELEASE TRANSDERMAL ONCE
Status: DISCONTINUED | OUTPATIENT
Start: 2020-09-15 | End: 2020-09-15

## 2020-09-15 RX ORDER — SODIUM CHLORIDE 0.9 % (FLUSH) 0.9 %
10 SYRINGE (ML) INJECTION EVERY 12 HOURS SCHEDULED
Status: DISCONTINUED | OUTPATIENT
Start: 2020-09-15 | End: 2020-09-15 | Stop reason: HOSPADM

## 2020-09-15 RX ORDER — FENTANYL CITRATE 50 UG/ML
INJECTION, SOLUTION INTRAMUSCULAR; INTRAVENOUS PRN
Status: DISCONTINUED | OUTPATIENT
Start: 2020-09-15 | End: 2020-09-15 | Stop reason: SDUPTHER

## 2020-09-15 RX ORDER — CALCIUM CHLORIDE 100 MG/ML
INJECTION INTRAVENOUS; INTRAVENTRICULAR PRN
Status: DISCONTINUED | OUTPATIENT
Start: 2020-09-15 | End: 2020-09-15 | Stop reason: ALTCHOICE

## 2020-09-15 RX ORDER — DEXAMETHASONE SODIUM PHOSPHATE 4 MG/ML
INJECTION, SOLUTION INTRA-ARTICULAR; INTRALESIONAL; INTRAMUSCULAR; INTRAVENOUS; SOFT TISSUE PRN
Status: DISCONTINUED | OUTPATIENT
Start: 2020-09-15 | End: 2020-09-15 | Stop reason: SDUPTHER

## 2020-09-15 RX ORDER — SODIUM CHLORIDE, SODIUM LACTATE, POTASSIUM CHLORIDE, CALCIUM CHLORIDE 600; 310; 30; 20 MG/100ML; MG/100ML; MG/100ML; MG/100ML
INJECTION, SOLUTION INTRAVENOUS CONTINUOUS
Status: DISCONTINUED | OUTPATIENT
Start: 2020-09-15 | End: 2020-09-15

## 2020-09-15 RX ORDER — OXYCODONE HYDROCHLORIDE 10 MG/1
10 TABLET ORAL EVERY 4 HOURS PRN
Status: DISCONTINUED | OUTPATIENT
Start: 2020-09-15 | End: 2020-09-15 | Stop reason: HOSPADM

## 2020-09-15 RX ORDER — SENNA AND DOCUSATE SODIUM 50; 8.6 MG/1; MG/1
1 TABLET, FILM COATED ORAL 2 TIMES DAILY
Status: DISCONTINUED | OUTPATIENT
Start: 2020-09-15 | End: 2020-09-15 | Stop reason: HOSPADM

## 2020-09-15 RX ORDER — DIPHENHYDRAMINE HYDROCHLORIDE 50 MG/ML
12.5 INJECTION INTRAMUSCULAR; INTRAVENOUS
Status: DISCONTINUED | OUTPATIENT
Start: 2020-09-15 | End: 2020-09-15 | Stop reason: HOSPADM

## 2020-09-15 RX ORDER — LIDOCAINE HYDROCHLORIDE 10 MG/ML
INJECTION, SOLUTION EPIDURAL; INFILTRATION; INTRACAUDAL; PERINEURAL PRN
Status: DISCONTINUED | OUTPATIENT
Start: 2020-09-15 | End: 2020-09-15 | Stop reason: SDUPTHER

## 2020-09-15 RX ORDER — PROMETHAZINE HYDROCHLORIDE 25 MG/ML
6.25 INJECTION, SOLUTION INTRAMUSCULAR; INTRAVENOUS
Status: DISCONTINUED | OUTPATIENT
Start: 2020-09-15 | End: 2020-09-15

## 2020-09-15 RX ORDER — OXYCODONE HYDROCHLORIDE 5 MG/1
5 TABLET ORAL EVERY 4 HOURS PRN
Status: DISCONTINUED | OUTPATIENT
Start: 2020-09-15 | End: 2020-09-15 | Stop reason: HOSPADM

## 2020-09-15 RX ORDER — 0.9 % SODIUM CHLORIDE 0.9 %
500 INTRAVENOUS SOLUTION INTRAVENOUS
Status: DISCONTINUED | OUTPATIENT
Start: 2020-09-15 | End: 2020-09-15 | Stop reason: HOSPADM

## 2020-09-15 RX ORDER — FENTANYL CITRATE 50 UG/ML
25 INJECTION, SOLUTION INTRAMUSCULAR; INTRAVENOUS EVERY 5 MIN PRN
Status: DISCONTINUED | OUTPATIENT
Start: 2020-09-15 | End: 2020-09-15 | Stop reason: HOSPADM

## 2020-09-15 RX ORDER — TRANEXAMIC ACID 100 MG/ML
INJECTION, SOLUTION INTRAVENOUS PRN
Status: DISCONTINUED | OUTPATIENT
Start: 2020-09-15 | End: 2020-09-15 | Stop reason: SDUPTHER

## 2020-09-15 RX ORDER — HYDRALAZINE HYDROCHLORIDE 20 MG/ML
5 INJECTION INTRAMUSCULAR; INTRAVENOUS EVERY 10 MIN PRN
Status: DISCONTINUED | OUTPATIENT
Start: 2020-09-15 | End: 2020-09-15 | Stop reason: HOSPADM

## 2020-09-15 RX ORDER — PROPOFOL 10 MG/ML
INJECTION, EMULSION INTRAVENOUS PRN
Status: DISCONTINUED | OUTPATIENT
Start: 2020-09-15 | End: 2020-09-15 | Stop reason: SDUPTHER

## 2020-09-15 RX ORDER — SODIUM CHLORIDE, SODIUM LACTATE, POTASSIUM CHLORIDE, CALCIUM CHLORIDE 600; 310; 30; 20 MG/100ML; MG/100ML; MG/100ML; MG/100ML
INJECTION, SOLUTION INTRAVENOUS CONTINUOUS
Status: DISCONTINUED | OUTPATIENT
Start: 2020-09-15 | End: 2020-09-15 | Stop reason: HOSPADM

## 2020-09-15 RX ORDER — ACETAMINOPHEN 500 MG
500 TABLET ORAL EVERY 6 HOURS PRN
Status: ON HOLD | COMMUNITY
End: 2020-09-15 | Stop reason: HOSPADM

## 2020-09-15 RX ORDER — METOCLOPRAMIDE HYDROCHLORIDE 5 MG/ML
10 INJECTION INTRAMUSCULAR; INTRAVENOUS
Status: COMPLETED | OUTPATIENT
Start: 2020-09-15 | End: 2020-09-15

## 2020-09-15 RX ORDER — LABETALOL HYDROCHLORIDE 5 MG/ML
5 INJECTION, SOLUTION INTRAVENOUS EVERY 10 MIN PRN
Status: DISCONTINUED | OUTPATIENT
Start: 2020-09-15 | End: 2020-09-15 | Stop reason: HOSPADM

## 2020-09-15 RX ORDER — ONDANSETRON 2 MG/ML
INJECTION INTRAMUSCULAR; INTRAVENOUS PRN
Status: DISCONTINUED | OUTPATIENT
Start: 2020-09-15 | End: 2020-09-15 | Stop reason: SDUPTHER

## 2020-09-15 RX ORDER — ROCURONIUM BROMIDE 10 MG/ML
INJECTION, SOLUTION INTRAVENOUS PRN
Status: DISCONTINUED | OUTPATIENT
Start: 2020-09-15 | End: 2020-09-15 | Stop reason: SDUPTHER

## 2020-09-15 RX ORDER — DEXAMETHASONE SODIUM PHOSPHATE 10 MG/ML
10 INJECTION INTRAMUSCULAR; INTRAVENOUS ONCE
Status: COMPLETED | OUTPATIENT
Start: 2020-09-15 | End: 2020-09-15

## 2020-09-15 RX ORDER — GABAPENTIN 600 MG/1
300 TABLET ORAL ONCE
Status: COMPLETED | OUTPATIENT
Start: 2020-09-15 | End: 2020-09-15

## 2020-09-15 RX ADMIN — MIDAZOLAM 2 MG: 1 INJECTION INTRAMUSCULAR; INTRAVENOUS at 07:26

## 2020-09-15 RX ADMIN — FENTANYL CITRATE 25 MCG: 50 INJECTION, SOLUTION INTRAMUSCULAR; INTRAVENOUS at 07:32

## 2020-09-15 RX ADMIN — CEFAZOLIN 2 G: 1 INJECTION, POWDER, FOR SOLUTION INTRAMUSCULAR; INTRAVENOUS at 14:54

## 2020-09-15 RX ADMIN — TRANEXAMIC ACID 1000 MG: 100 INJECTION, SOLUTION INTRAVENOUS at 07:38

## 2020-09-15 RX ADMIN — FENTANYL CITRATE 50 MCG: 50 INJECTION, SOLUTION INTRAMUSCULAR; INTRAVENOUS at 07:55

## 2020-09-15 RX ADMIN — FENTANYL CITRATE 50 MCG: 50 INJECTION, SOLUTION INTRAMUSCULAR; INTRAVENOUS at 09:00

## 2020-09-15 RX ADMIN — ONDANSETRON 4 MG: 2 INJECTION INTRAMUSCULAR; INTRAVENOUS at 07:40

## 2020-09-15 RX ADMIN — SODIUM CHLORIDE, POTASSIUM CHLORIDE, SODIUM LACTATE AND CALCIUM CHLORIDE: 600; 310; 30; 20 INJECTION, SOLUTION INTRAVENOUS at 11:24

## 2020-09-15 RX ADMIN — FENTANYL CITRATE 25 MCG: 50 INJECTION, SOLUTION INTRAMUSCULAR; INTRAVENOUS at 07:52

## 2020-09-15 RX ADMIN — GABAPENTIN 300 MG: 600 TABLET, FILM COATED ORAL at 06:41

## 2020-09-15 RX ADMIN — SUGAMMADEX 200 MG: 100 INJECTION, SOLUTION INTRAVENOUS at 08:38

## 2020-09-15 RX ADMIN — METOCLOPRAMIDE 10 MG: 5 INJECTION, SOLUTION INTRAMUSCULAR; INTRAVENOUS at 09:37

## 2020-09-15 RX ADMIN — DEXAMETHASONE SODIUM PHOSPHATE 10 MG: 10 INJECTION INTRAMUSCULAR; INTRAVENOUS at 06:51

## 2020-09-15 RX ADMIN — ACETAMINOPHEN 1000 MG: 500 TABLET, FILM COATED ORAL at 06:41

## 2020-09-15 RX ADMIN — LIDOCAINE HYDROCHLORIDE 50 MG: 10 INJECTION, SOLUTION EPIDURAL; INFILTRATION; INTRACAUDAL; PERINEURAL at 07:32

## 2020-09-15 RX ADMIN — ACETAMINOPHEN 650 MG: 325 TABLET, FILM COATED ORAL at 11:23

## 2020-09-15 RX ADMIN — SODIUM CHLORIDE, POTASSIUM CHLORIDE, SODIUM LACTATE AND CALCIUM CHLORIDE: 600; 310; 30; 20 INJECTION, SOLUTION INTRAVENOUS at 08:33

## 2020-09-15 RX ADMIN — CEFAZOLIN 2 G: 10 INJECTION, POWDER, FOR SOLUTION INTRAVENOUS at 07:41

## 2020-09-15 RX ADMIN — DEXAMETHASONE SODIUM PHOSPHATE 4 MG: 4 INJECTION, SOLUTION INTRA-ARTICULAR; INTRALESIONAL; INTRAMUSCULAR; INTRAVENOUS; SOFT TISSUE at 07:40

## 2020-09-15 RX ADMIN — ROCURONIUM BROMIDE 40 MG: 10 INJECTION INTRAVENOUS at 07:32

## 2020-09-15 RX ADMIN — SODIUM CHLORIDE, POTASSIUM CHLORIDE, SODIUM LACTATE AND CALCIUM CHLORIDE: 600; 310; 30; 20 INJECTION, SOLUTION INTRAVENOUS at 06:51

## 2020-09-15 RX ADMIN — TRANEXAMIC ACID 1000 MG: 100 INJECTION, SOLUTION INTRAVENOUS at 08:49

## 2020-09-15 RX ADMIN — FENTANYL CITRATE 50 MCG: 50 INJECTION, SOLUTION INTRAMUSCULAR; INTRAVENOUS at 07:50

## 2020-09-15 RX ADMIN — PROPOFOL 130 MG: 10 INJECTION, EMULSION INTRAVENOUS at 07:32

## 2020-09-15 ASSESSMENT — PULMONARY FUNCTION TESTS
PIF_VALUE: 22
PIF_VALUE: 15
PIF_VALUE: 23
PIF_VALUE: 22
PIF_VALUE: 22
PIF_VALUE: 23
PIF_VALUE: 21
PIF_VALUE: 25
PIF_VALUE: 23
PIF_VALUE: 22
PIF_VALUE: 22
PIF_VALUE: 23
PIF_VALUE: 15
PIF_VALUE: 23
PIF_VALUE: 22
PIF_VALUE: 22
PIF_VALUE: 1
PIF_VALUE: 21
PIF_VALUE: 21
PIF_VALUE: 1
PIF_VALUE: 21
PIF_VALUE: 15
PIF_VALUE: 22
PIF_VALUE: 21
PIF_VALUE: 2
PIF_VALUE: 22
PIF_VALUE: 1
PIF_VALUE: 15
PIF_VALUE: 22
PIF_VALUE: 23
PIF_VALUE: 11
PIF_VALUE: 22
PIF_VALUE: 2
PIF_VALUE: 21
PIF_VALUE: 23
PIF_VALUE: 16
PIF_VALUE: 22
PIF_VALUE: 23
PIF_VALUE: 21
PIF_VALUE: 16
PIF_VALUE: 21
PIF_VALUE: 22
PIF_VALUE: 17
PIF_VALUE: 1
PIF_VALUE: 22
PIF_VALUE: 21
PIF_VALUE: 22
PIF_VALUE: 21
PIF_VALUE: 22
PIF_VALUE: 22
PIF_VALUE: 16
PIF_VALUE: 22
PIF_VALUE: 16
PIF_VALUE: 22
PIF_VALUE: 0
PIF_VALUE: 22
PIF_VALUE: 22
PIF_VALUE: 1
PIF_VALUE: 16
PIF_VALUE: 1
PIF_VALUE: 3
PIF_VALUE: 23
PIF_VALUE: 2
PIF_VALUE: 23
PIF_VALUE: 3
PIF_VALUE: 24
PIF_VALUE: 14
PIF_VALUE: 23
PIF_VALUE: 14
PIF_VALUE: 22
PIF_VALUE: 23
PIF_VALUE: 22
PIF_VALUE: 21
PIF_VALUE: 24
PIF_VALUE: 22
PIF_VALUE: 21
PIF_VALUE: 23
PIF_VALUE: 22
PIF_VALUE: 21
PIF_VALUE: 16
PIF_VALUE: 15
PIF_VALUE: 1
PIF_VALUE: 2
PIF_VALUE: 16
PIF_VALUE: 22
PIF_VALUE: 18
PIF_VALUE: 24
PIF_VALUE: 23
PIF_VALUE: 21

## 2020-09-15 ASSESSMENT — PAIN SCALES - GENERAL
PAINLEVEL_OUTOF10: 0
PAINLEVEL_OUTOF10: 0
PAINLEVEL_OUTOF10: 4
PAINLEVEL_OUTOF10: 0
PAINLEVEL_OUTOF10: 4
PAINLEVEL_OUTOF10: 5

## 2020-09-15 ASSESSMENT — PAIN - FUNCTIONAL ASSESSMENT
PAIN_FUNCTIONAL_ASSESSMENT: PREVENTS OR INTERFERES SOME ACTIVE ACTIVITIES AND ADLS
PAIN_FUNCTIONAL_ASSESSMENT: 0-10

## 2020-09-15 ASSESSMENT — PAIN DESCRIPTION - LOCATION
LOCATION: KNEE

## 2020-09-15 ASSESSMENT — PAIN DESCRIPTION - ORIENTATION
ORIENTATION: RIGHT

## 2020-09-15 ASSESSMENT — PAIN DESCRIPTION - DESCRIPTORS
DESCRIPTORS: CONSTANT;SPASM
DESCRIPTORS: ACHING
DESCRIPTORS: CONSTANT;SORE
DESCRIPTORS: ACHING
DESCRIPTORS: BURNING;DISCOMFORT

## 2020-09-15 ASSESSMENT — PAIN DESCRIPTION - FREQUENCY
FREQUENCY: CONTINUOUS

## 2020-09-15 ASSESSMENT — PAIN DESCRIPTION - PAIN TYPE
TYPE: SURGICAL PAIN

## 2020-09-15 ASSESSMENT — PAIN DESCRIPTION - ONSET: ONSET: ON-GOING

## 2020-09-15 ASSESSMENT — PAIN DESCRIPTION - PROGRESSION: CLINICAL_PROGRESSION: GRADUALLY IMPROVING

## 2020-09-15 NOTE — DISCHARGE INSTR - COC
Continuity of Care Form    Patient Name: Sadie Geronimo   :  1945  MRN:  [id number]    Admit date:  9/15/2020  Discharge date:  9/15/2020    Code Status Order: Full Code   Advance Directives:   Advance Care Flowsheet Documentation       Date/Time Healthcare Directive Type of Healthcare Directive Copy in [address] Agent's Name Healthcare Agent's Phone Number    09/15/20 0631  No, patient does not have an advance directive for healthcare treatment declined info -- -- -- -- --            Admitting Physician:  Jorge Elliott MD  PCP: Leonard Brito MD    Discharging Nurse: [facility] Unit/Room#: [address] [facility] Unit Phone Number: [phone]    Emergency Contact:   Extended Emergency Contact Information  Primary Emergency Contact: [facility]  Address: [place] [address], [address]  Home Phone: [phone]  Relation: Child  Secondary Emergency Contact: Janie Horton, [address]  Home Phone: [phone]  Relation: Spouse    Past Surgical History:  Past Surgical History:   Procedure Laterality Date    COLONOSCOPY      PRE-MALIGNANT / BENIGN SKIN LESION EXCISION Right 2015    Rt cheek cyst with local flap closure    TOTAL KNEE ARTHROPLASTY Left 2015       Immunization History: There is no immunization history on file for this patient.     Active Problems:  Patient Active Problem List   Diagnosis Code    Sebaceous cyst L72.3    Primary osteoarthritis of right knee M17.11       Isolation/Infection:   Isolation            No Isolation          Patient Infection Status       Infection Onset Added Last Indicated Last Indicated By Review Planned Expiration Resolved Resolved By    None active    Resolved    COVID-19 Rule Out 20 Covid-19 Ambulatory (Ordered)   20 Rule-Out Test Resulted            Nurse Assessment:  Last Vital Signs: BP (!) 150/90   Pulse 84   Temp 98 °F (36.7 °C) (Infrared)   Resp 16   Ht 5' 2\" (1.575 m)   Wt 220 lb (99.8 kg)   SpO2 96%   BMI 40.24 kg/m²     Last documented pain score (0-10 scale): Pain Level: 0  Last Weight:   Wt Readings from Last 1 Encounters:   09/15/20 220 lb (99.8 kg)     Mental Status:  oriented and alert    IV Access:  - None    Nursing Mobility/ADLs:  Walking   Assisted  Transfer  Assisted  Bathing  Assisted  Dressing  Assisted  Toileting  Independent  Feeding  Independent  Med Admin  Independent  Med Delivery   whole      Safety Concerns: At Risk for Falls    Impairments/Disabilities:      None    Nutrition Therapy:  Current Nutrition Therapy:   - Oral Diet:  General    Routes of Feeding: Oral  Liquids: No Restrictions  Daily Fluid Restriction: no  Last Modified Barium Swallow with Video (Video Swallowing Test): not done    Treatments at the Time of Hospital Discharge:   Respiratory Treatments: n/a  Oxygen Therapy:  is not on home oxygen therapy. Ventilator:    - No ventilator support    Rehab Therapies: Physical Therapy and Occupational Therapy  Weight Bearing Status/Restrictions: No weight bearing restirctions  Other Medical Equipment (for information only, NOT a DME order):  walker  Other Treatments: skilled nursing assessment  Derek Rebollar [id number]  This patient is a post-operative total joint replacement patient. Expectations for this patients care are as follows:    Initial RN Visit to establish care and verify patient meds plus daily PT for two weeks. Goals:   DailyTherapy for rehabilitative purposes for two weeks.  Increased level of activity and ambulation each day.  Well-controlled pain.  Free from infection.  Encourage patient to provide self-care when possible.  Ambulation with a rolling walker. Activity & Diet:   Therapy performed daily. (Instruct patient to take pain meds. 1 hour prior to therapy.)   Up in chair for all meals and majority of day.    Range of motion for TKA patients.  Hip precautions for SHARON patients.  Incentive Spirometer: 3- 4 inhalations every twenty minutes, during the day, while awake, the first week home after discharge   Increase oral intake of fruits, fiber and water and take a daily stool softener to prevent constipation.  Consider stronger bowel protocol if no bowel movement is achieved after three days home.  Increase protein intake and reduce sugar intake to promote healing and prevent infection.  No pillow under the knee for TKA patients. [address] [place] go on in the a.m. and come off in the p.m. (Hand wash them every two or three days, roll in towel to remove most of moisture, and hang to dry.)    Incision Care:   Keep incisional dressing intact until seen and removed by surgeon, unless saturated, in which case, call surgeon and request instructions.  If dressing falls off, call surgeon.  If using the Prevena dressing, with battery pack, place battery pack in waterproof bag during shower. If using Aquacel dressing then dressing is waterproof and patient may shower.  Elevated the leg and ice the affected area four times a day, for twenty minutes each time and after every physical therapy session. Normal Conditions - Will improve with provided comfort measures and time:   Some swelling in the operative leg is normal - this should reduce over time.  Some post-operative pain is normal.  This will improve with prescribed medication, provided comfort measures and time.  Constipation related to pain medications & decreased mobility is a common occurrence. (Increase your fiber & water intake and take a stool softener.)    Slight warmth of operative site is normal and will diminish with time.  Fatigue and moderate pain after therapy is normal and will improve with time.  Numbness near the incision site is normal and will improve with time.     NOTE: Ensure/Remind patient to go to their follow-up appointment with their orthopedic surgeon, which is scheduled: [email or web address]    Abnormal Conditions - When to call the Surgeon:   Increasing/excessive redness, warmth or swelling at the incisional site not relieved with ice and elevation.  Increasing/excessive pain not well-controlled by prescribed medications.  Drainage or odor from or around the incision site.  (A little blood showing through the bandage is ok, but active leaking, of any color, coming out of the bandage is NOT normal.)   Temperature above 101 degrees. (A mild temp of 99 - 100 is normal - if temp gets to 101 you may use Tylenol once - if it does not improve, you may use a 2nd dose of Tylenol after 5 hours - if temperature is still at or above 101 degrees then call the surgeon.)   Calf tenderness, swelling, or redness or numbness of the foot/lower leg.  Shortness of breath or chest pain.  Any other incision or surgical-related concerns.  CALL SURGEON with concerns PRIOR TO sending patient to hospital.        Patient's personal belongings (please select all that are sent with patient):  None    RN SIGNATURE:  Electronically signed by Henrry Park RN on 9/15/20 at 10:39 AM EDT    CASE MANAGEMENT/SOCIAL WORK SECTION    Inpatient Status Date:     Readmission Risk Assessment Score:  Readmission Risk              Risk of Unplanned Readmission:        0           Discharging to Facility/ [address]  P: [phone]  · F: [phone]  ·     / signature: Electronically signed by Henrry Park RN on 9/15/20 at 10:38 AM EDT    PHYSICIAN SECTION    Prognosis: Good    Condition at Discharge: Stable    Rehab Potential (if transferring to Rehab): Good    Recommended Labs or Other Treatments After Discharge: see above    Physician Certification: I certify the above information and transfer of Mary Suggs  is necessary for the continuing treatment of the diagnosis listed and that she requires Home Care for less 30 days. Update Admission H&P: No change in H&P    PHYSICIAN SIGNATURE:  Electronically signed by Sheeba Miller MD on 9/15/20 at 7:31 AM EDT

## 2020-09-15 NOTE — FLOWSHEET NOTE
Discharge nstructions reviewed with the patient and her . All questions answered. Pain medications discussed in detail. Anti em stockings sent home withpatient .

## 2020-09-15 NOTE — PROGRESS NOTES
[address]   Occupational Therapy Evaluation  Date: 9/15/20  Patient Name: Shun Gifford       Room: 7220/2754-50  MRN: [id number]  Account: [de-identified]   : 1945  (66 y.o.) Gender: female     Discharge Recommendations: The patient would benefit from additional Occupational Therapy after discharge from the facility upon return to their Home. Equipment Needed: Yes    Referring Practitioner: Dr. Maco Gupta   Diagnosis: Right knee DJD, s/p R TKA 9/15   Additional Pertinent Hx: Pt is a 65 y/o female presenting for elective R TKA    Treatment Diagnosis: impaired self-care and functional mobility      Assessment  Performance deficits / Impairments: Decreased functional mobility , Decreased ADL status, Decreased endurance, Decreased balance, Decreased high-level IADLs  Assessment: pt s/p R TKA, previously independent with ADLs, IADLs, driving. Pt now requires increased assist after surgery  Treatment Diagnosis: impaired self-care and functional mobility   Prognosis: Good  Decision Making: Medium Complexity  REQUIRES OT FOLLOW UP: Yes  Treatment Initiated : ADLs , functional mobility   Discharge Recommendations: 24 hour supervision or assist  Activity Tolerance: Patient limited by fatigue, Patient Tolerated treatment well  Activity Tolerance: SPO2 94-95 before/after mobility    Past Medical History:  has a past medical history of Arthritis, Hyperlipidemia, and Wears glasses. Past Surgical History:   has a past surgical history that includes Total knee arthroplasty (Left, ); pre-malignant / benign skin lesion excision (Right, 2015); Colonoscopy; and Total knee arthroplasty (Right, 9/15/2020).     Restrictions  Restrictions/Precautions: General Precautions, Fall Risk, Surgical Protocols  Implants present? : Metal implants(L TKA/R TKA)  Other position/activity restrictions: activity orders per Dr Maco Gupta  Required Braces or Orthoses?: No     Vitals  Temp: 97.7 °F (36.5 °C)  Pulse: 88  Resp: 16  BP: (!) 146/81  Height: 5' 2\" (157.5 cm)  Weight: 220 lb (99.8 kg)  BMI (Calculated): 40.3  Oxygen Therapy  SpO2: 94 %  Pulse Oximeter Device Mode: Continuous  Pulse Oximeter Device Location: Left, Hand, Finger  O2 Device: None (Room air)  O2 Flow Rate (L/min): 2 L/min  Level of Consciousness: Alert    Subjective  Subjective: Pt with no complaints to report, notes mild aching in R knee   Comments: RN mateo'antonia pt for OT/PT evaluation. Pt seated in hospital recliner upon entry with  at side. Pt pleasant and cooperative throughout   Overall Orientation Status: Within Functional Limits  Vision  Vision: Impaired  Vision Exceptions: Wears glasses at all times  Hearing  Hearing: Within functional limits     Social/Functional History  Lives With: Spouse  Type of Home: House  Home Layout: Two level, Bed/Bath upstairs, 1/2 bath on main level(able to stay on first floor with recliner)  Home Access: Stairs to enter without rails  Entrance Stairs - Number of Steps: 2  Bathroom Shower/Tub: Walk-in shower, Curtain  Bathroom Toilet: Handicap height  Bathroom Equipment: Built-in shower seat  Bathroom Accessibility: Accessible, Walker accessible  Home Equipment: Rolling walker, Cane  ADL Assistance: Independent  Homemaking Assistance: Independent  Homemaking Responsibilities: Yes  Ambulation Assistance: Independent(using cane)  Transfer Assistance: Independent  Active : Yes  Mode of Transportation: Car  Occupation: Retired  Type of occupation:   IADL Comments: sleep in flat bed, tall  Additional Comments:  works 1 day/week, daughter will be over when  is working.     Pain Assessment  Pain Assessment: 0-10  Pain Level: 4  Pain Type: Surgical pain  Pain Location: Knee  Pain Orientation: Right  Pain Descriptors: Constant, Spasm  Pain Frequency: Continuous    Objective    Perception  Overall Perceptual Status: WFL  Sensation  Overall Sensation Status: WFL(pt notes intermittent tingling in B hands from carpal tunnel)     ADL  Feeding: Independent(per pt report,)  Grooming: Contact guard assistance(standing sink-side for hand hygiene. )  UE Bathing: Stand by assistance  LE Bathing: Minimal assistance  UE Dressing: Supervision(pt donned shirt and bra seated edge of bed )  LE Dressing: Contact guard assistance(pt donned underwear and pants. Contact guard while reaching to floor level to thread pants over BLE, contact guard assist while standing to pull pants around waist)  Toileting: Stand by assistance  Additional Comments: ADLs assessed through clinical observation and reasoning unless otherwise noted     UE Function  LUE Strength  Gross LUE Strength: WFL  L Hand General: 5/5  LUE Strength Comment: 5/5 gross upper extremity strength     LUE Tone: Normotonic     LUE AROM (degrees)  LUE AROM : WFL     Left Hand AROM (degrees)  Left Hand AROM: WFL  RUE Strength  Gross RUE Strength: WFL  R Hand General: 5/5  RUE Strength Comment: 5/5 gross upper extremity strength      RUE Tone: Normotonic     RUE AROM (degrees)  RUE AROM : WFL     Right Hand AROM (degrees)  Right Hand AROM: WFL    Fine Motor Skills  Coordination  Movements Are Fluid And Coordinated: Yes        Mobility          Balance  Sitting Balance: Supervision  Standing Balance: Contact guard assistance  Standing Balance  Comment: No right knee buckling noted with static standing, lateral weightshift, or functional mobility   Functional Mobility  Functional - Mobility Device: Rolling Walker  Activity: To/from bathroom, Other(within room)  Assist Level: Contact guard assistance  Functional Mobility Comments: Pt educated on appropriate rolling walker positioning and sequencing. Pt with antalgic gait, relying on BUE to offload RLE with weightbearing. No loss of balance .  Pt completed stairs with physical therapy  Bed mobility  Comment: pt seated in recliner upon entry/exit      Transfers  Sit to stand: Minimal assistance  Stand to sit: Contact guard

## 2020-09-15 NOTE — FLOWSHEET NOTE
Patient admitted to room 2042 per bed from PACU. Assessment and orientation to the room and call system completed.  at bedside. Plan and orders reviewed with the patient and her spouse.

## 2020-09-15 NOTE — ANESTHESIA POSTPROCEDURE EVALUATION
Department of Anesthesiology  Postprocedure Note    Patient: Precious Mandel  MRN: [id number]  YOB: 1945  Date of evaluation: 9/15/2020  Time:  10:30 AM     Procedure Summary     Date:  09/15/20 Room / Location:  [address] [place] [address] Maude Girard 03 / [facility]    Anesthesia Start:  6051 Anesthesia Stop:  2741    Procedure:  KNEE TOTAL ARTHROPLASTY WITH GPS (Right Knee) Diagnosis:  (DEGENERATIVE JOINT DISEASE)    Surgeon:  Amira Arellano MD Responsible Provider:  Denilson Rich MD    Anesthesia Type:  general, regional ASA Status:  3          Anesthesia Type: general, regional    Ian Phase I: Ian Score: 8    Ian Phase II:      Last vitals: Reviewed and per EMR flowsheets.        Anesthesia Post Evaluation    Comments: POST- ANESTHESIA EVALUATION       Pt Name: Precious Mandel  MRN: [id number]  [place]: 1945  Date of evaluation: 9/15/2020  Time:  10:30 AM      BP (!) 145/49   Pulse 90   Temp 97.8 °F (36.6 °C)   Resp 23   Ht 5' 2\" (1.575 m)   Wt 220 lb (99.8 kg)   SpO2 94%   BMI 40.24 kg/m²      Consciousness Level  Awake  Cardiopulmonary Status  Stable  Pain Adequately Treated YES  Nausea / Vomiting  NO  Adequate Hydration  YES  Anesthesia Related Complications NONE      Electronically signed by Denilson Rich MD on 9/15/2020 at 10:30 AM

## 2020-09-15 NOTE — ANESTHESIA PRE PROCEDURE
 Sebaceous cyst L72.3       Past Medical History:        Diagnosis Date    Arthritis     Hyperlipidemia     Wears glasses        Past Surgical History:        Procedure Laterality Date    COLONOSCOPY      PRE-MALIGNANT / BENIGN SKIN LESION EXCISION Right 11/16/2015    Rt cheek cyst with local flap closure    TOTAL KNEE ARTHROPLASTY Left 2015       Social History:    Social History     Tobacco Use    Smoking status: Never Smoker    Smokeless tobacco: Never Used   Substance Use Topics    Alcohol use: Yes     Comment: Social                                Counseling given: Not Answered      Vital Signs (Current):   Vitals:    09/15/20 0624   BP: (!) 150/90   Pulse: 84   Resp: 16   Temp: 98 °F (36.7 °C)   TempSrc: Infrared   SpO2: 96%   Weight: 220 lb (99.8 kg)   Height: 5' 2\" (1.575 m)                                              BP Readings from Last 3 Encounters:   09/15/20 (!) 150/90   09/01/20 (!) 148/76   12/16/15 145/89       NPO Status: Time of last liquid consumption: 2100                        Time of last solid consumption: 2030                        Date of last liquid consumption: 09/14/20                        Date of last solid food consumption: 09/14/20    BMI:   Wt Readings from Last 3 Encounters:   09/15/20 220 lb (99.8 kg)   09/01/20 220 lb (99.8 kg)   02/08/16 219 lb (99.3 kg)     Body mass index is 40.24 kg/m².     CBC:   Lab Results   Component Value Date    WBC 8.2 09/01/2020    RBC 4.48 09/01/2020    RBC 4.15 12/30/2011    HGB 14.2 09/01/2020    HCT 41.7 09/01/2020    MCV 93.0 09/01/2020    RDW 14.0 09/01/2020     09/01/2020     12/30/2011       CMP:   Lab Results   Component Value Date     09/01/2020    K 4.4 09/01/2020     09/01/2020    CO2 29 09/01/2020    BUN 16 09/01/2020    CREATININE 0.55 09/01/2020    GFRAA >60 09/01/2020    LABGLOM >60 09/01/2020    GLUCOSE 145 09/01/2020    GLUCOSE 125 12/30/2011    PROT 6.8 08/10/2013    CALCIUM 8.8 09/01/2020 BILITOT 0.46 08/10/2013    ALKPHOS 66 08/10/2013    AST 22 08/10/2013    ALT 23 08/10/2013       POC Tests: No results for input(s): POCGLU, POCNA, POCK, POCCL, POCBUN, POCHEMO, POCHCT in the last 72 hours. Coags: No results found for: PROTIME, INR, APTT    HCG (If Applicable): No results found for: PREGTESTUR, PREGSERUM, HCG, HCGQUANT     ABGs: No results found for: PHART, PO2ART, KTO7RWE, TZC6HRR, BEART, X4USNVZY     Type & Screen (If Applicable):  No results found for: LABABO, LABRH    Drug/Infectious Status (If Applicable):  No results found for: HIV, HEPCAB    COVID-19 Screening (If Applicable):   Lab Results   Component Value Date    COVID19 Not Detected 09/11/2020         Anesthesia Evaluation  Patient summary reviewed and Nursing notes reviewed no history of anesthetic complications:   Airway: Mallampati: III  TM distance: >3 FB   Neck ROM: full  Mouth opening: > = 3 FB Dental: normal exam         Pulmonary:Negative Pulmonary ROS and normal exam  breath sounds clear to auscultation                             Cardiovascular:    (+) hyperlipidemia        Rhythm: regular  Rate: normal                    Neuro/Psych:   (+) psychiatric history: stable with treatmentdepression/anxiety             GI/Hepatic/Renal:   (+) morbid obesity          Endo/Other:    (+) : arthritis: OA., .                 Abdominal:           Vascular: negative vascular ROS. Anesthesia Plan      general and regional     ASA 3     (Postop RIGHT adductor canal block - r/b/a discussed including bleeding, infection, and nerve injury. Patient agrees to proceed with procedure.)  Induction: intravenous. MIPS: Postoperative opioids intended and Prophylactic antiemetics administered. Anesthetic plan and risks discussed with patient. Plan discussed with CRNA.                   Taryn Atkins MD   9/15/2020

## 2020-09-15 NOTE — OP NOTE
Operative Note      Patient: Johan Yan  YOB: 1945  MRN: [id number]    Date of Procedure: 9/15/2020    Pre-Op Diagnosis: DEGENERATIVE JOINT DISEASE right knee    Post-Op Diagnosis: Same       Procedure(s):  KNEE TOTAL ARTHROPLASTY WITH GPS Right    Surgeon(s):  Юлия Marks MD    Assistant:   Resident: DO Giovany Monreal CST   anesthesia: General    Estimated Blood Loss (mL): Minimal    Complications: None    Specimens:   * No specimens in log *    Implants:  Implant Name Type Inv. Item Serial No.  Lot No. LRB No. Used Action   CEMENT BONE R 1X40 US Cement CEMENT BONE R 1X40 US  [employer] [id number] Right 2 Implanted   IMPL KNEE PATELLA COMP 3 PEG THIN 34 Knee IMPL KNEE PATELLA COMP 3 PEG THIN 34  [employer] [id number] Right 1 Implanted   IMPL KNEE VANGUARD CR FEM 62.5 MM RT INTERLINK Knee IMPL KNEE VANGUARD CR FEM 62.5 MM RT INTERLINK  [employer] [id number] Right 1 Implanted   PLATE KNEE TIB TRAY CRUC COBLT 71MM Knee PLATE KNEE TIB TRAY CRUC COBLT 71MM  [employer] [id number] Right 1 Implanted   IMPL KNEE TIB BEARING VANGUARD [id number] Knee IMPL KNEE TIB BEARING VANGUARD [id number]  [employer] [id number] Right 1 Implanted         Drains: * No LDAs found *    Findings: Severe DJD    Detailed Description of Procedure:     Patient is a 66 y.o. female with a long standing history of right DJD of the knee. Patient has failed all types of conservative treatment included physical therapy, weight-loss counseling, NSAIDS, and injections. The patient has significant restriction of activities of daily living and/or work/job place abilities, and, therefore, has been counseled for TKA. Patient is aware of all the risks and benefits as highlighted in the surgical consent form. The patient was given to grams of Ancef in the holding area as well as an adductor canal block. The patient was then taken to the operating suite where general anesthesia was administered.   A tourniquet was applied to effected leg and then prepped and draped in the usual sterile fashion. Time out was called to verify laterality. The leg was examined   and the tourniquet inflated to 350 mm of Hg. Midline incision was utilized and taken down to the joint capsule where a mid-vastus approach to the knee was performed. After a complete synovectomy, the patella was elevated, calibrated for thickness, and the above sized, patellar triple pegged drills guide positioned medially and then drilled. Atrial patellar button was positioned in order to re-established the original thickness. This was then replaced with a protective patellar plate. The knee was then flexed and revealed significant  arthrosis. Osteophytes were removed via rongeur. A drill hole was made in the distal femur and the femoral canal was then irrigated and suctioned. A fluted IM amy was inserted and a distal femoral cut was made at 5 degrees of valgus at the +2 setting. The proximal tibia was then exposed after resection of the ACL and lateral meniscus. An extra-medullary tibial cutting jug was then aligned in reference to the tibia tubercle and ankle mortise and positional with a slight posterior slope. The cut was made with minimal cutting of the lowest depth of the tibial wear and the fragment removed. The distal femur was then approached and femoral sizing guide with 3 degrees of external rotation was placed flush with the surface and drill holes made and femoral size determined. The appropriate size cutting jig was then placed and anterior, posterior, and chamfer cuts made with an oscillating saw. A laminar  was inserted with care to avoid damaging the MCL. Posterior osteophytes were removed and the capsule stripped from the posterior femoral condyles. The capsule was then injected with the orthopedic cocktail at this time. The tibial cut was then assessed with a baseplate and alignment amy to assure proper cut.  Spacer blocks were then inserted and the knee was assessed to determine the amount of soft tissue release and osteophyte removal necessary  to establish symmetric flexion and extension gaps. The tibia was then elevated, and the above sized tibial plate was positioned for proper external rotation with an alignment aym down the middle-third of the tibial tubercles. This was pinned in place, the proximal tibialis reamed, the fins were then repacked. The appropriate size femur was positioned and various size of the polyethylene trials were inserted. The knee was assessed for  ROM and patellar tracking. Satisfied with this, this distal femoral logs were drilled and then all the trial components were removed. The knee was irrigated and dried while the cement was prepared. Cement was applied to to both implant and cut surfaces and the implants impacted and the compression with one size larger poly inserted and excess cement removed. The patella was placed and compressed as well. The knee was placed in full extension and then injected with the remainder  of the 100ml of the orthopedic cocktail. The Irrisept lavage was carried out for the 1 minutes. This was then irrigated and a permanent polyethylene was insert was injected with platelet rich/poor plasma and the tourniquet was released at 45 minutes. Hemostasis was excellent. The knee was closed with a #1 Strata-Fix and vastus with a double-layer of O-Vicryl suture. The subcutaneous tissue closed with a 2-0 Monocryl Strata-Fix  and then a Zip-line used for the skin. This was covered with adaptic and Aquacel dressing and dressed with a large 6-inch Ace dressing from toes to mid-thigh. The patient was awakened and taken to PACU in good condition.      Electronically signed by Lonnie Craft MD on 9/15/2020 at 8:39 AM

## 2020-09-15 NOTE — PROGRESS NOTES
Physical Therapy    Facility/Department: [facility] MED SURG  Initial Assessment    NAME: Odalys Copeland  : 1945  MRN: [id number]    Date of Service: 9/15/2020    Discharge Recommendations: The patient would benefit from additional Physical  Therapy after discharge from the facility upon return to their Home. PT Equipment Recommendations  Equipment Needed: No  Other: Pt has RW at home    Assessment   Body structures, Functions, Activity limitations: Decreased functional mobility ; Decreased ADL status; Decreased ROM; Decreased strength; Increased pain;Decreased balance;Decreased endurance  Assessment: Pt is CGA/min x1 for transfers, CGA for amb and stairs. Pt will benefit from continued PT to maximize her independence. Treatment Diagnosis: Impaired functional mobility 2* R TKA  Specific instructions for Next Treatment: progress gait/stairs, go over HEP, R knee AROM  Prognosis: Excellent;Good  Decision Making: Low Complexity  History: R TKA by Dr Blank Herrera 9/15/20  Exam: ROM, MMT, bed mobility, transfers, amb, balance, stairs  Clinical Presentation: Pt alert, very pleasant and motivated. Barriers to Learning: none  REQUIRES PT FOLLOW UP: Yes  Activity Tolerance  Activity Tolerance: Patient Tolerated treatment well       Patient Diagnosis(es): The encounter diagnosis was Primary osteoarthritis of right knee. has a past medical history of Arthritis, Hyperlipidemia, and Wears glasses. has a past surgical history that includes Total knee arthroplasty (Left, ); pre-malignant / benign skin lesion excision (Right, 2015); Colonoscopy; and Total knee arthroplasty (Right, 9/15/2020).     Restrictions  Restrictions/Precautions  Restrictions/Precautions: General Precautions, Fall Risk, Surgical Protocols, Weight Bearing(L peripheral IV)  Required Braces or Orthoses?: No  Implants present? : Metal implants(L TKA/R TKA)  Lower Extremity Weight Bearing Restrictions  Right Lower Extremity Weight Bearing: Weight Bearing As Tolerated(Full WBAT)  Position Activity Restriction  Other position/activity restrictions: activity orders per Dr Davida Barnes: Impaired  Vision Exceptions: Wears glasses at all times  Hearing: Within functional limits     Subjective  General  Chart Reviewed: Yes  Patient assessed for rehabilitation services?: Yes  Additional Pertinent Hx: L TKA 2015  Family / Caregiver Present: Yes()  Referring Practitioner: Kurtis Monsivais MD   Referral Date : 09/15/20  Diagnosis: primary OA R knee  Follows Commands: Within Functional Limits  Subjective  Subjective: Pt very pleasant, motivated and agreeable to PT OT co angela. AMAURY Mcknight.  at bedside. Pt is in recliner. Pain Screening  Patient Currently in Pain: Yes  Pain Assessment  Pain Assessment: 0-10  Pain Level: 4  Pain Type: Surgical pain  Pain Location: Knee  Pain Orientation: Right  Pain Descriptors: Constant; Sore  Pain Frequency: Continuous  Pain Onset: On-going  Clinical Progression: Gradually improving  Functional Pain Assessment: Prevents or interferes some active activities and ADLs  Non-Pharmaceutical Pain Intervention(s): Ambulation/Increased Activity;Cold applied;Distraction;Elevation;Repositioned; Rest  Response to Pain Intervention: Patient Satisfied  Vital Signs  Patient Currently in Pain: Yes  Oxygen Therapy  SpO2: 94 %  O2 Device: None (Room air)  Patient Observation  Observations: ACE bandages in place RLE, no signs of drainage       Orientation  Orientation  Overall Orientation Status: Within Normal Limits  Social/Functional History  Social/Functional History  Lives With: Spouse  Type of Home: House  Home Layout: Two level, Bed/Bath upstairs, 1/2 bath on main level(able to stay on first floor with recliner)  Home Access: Stairs to enter without rails  Entrance Stairs - Number of Steps: 2  Bathroom Shower/Tub: Walk-in shower, Curtain  Bathroom Toilet: Handicap height  Bathroom Equipment: Built-in shower seat  Bathroom Accessibility: Accessible, Walker accessible  Home Equipment: Rolling walker, Cane  ADL Assistance: Independent  Homemaking Assistance: Independent  Homemaking Responsibilities: Yes  Ambulation Assistance: Independent(using cane)  Transfer Assistance: Independent  Active : Yes  Mode of Transportation: Car  Occupation: Retired  Type of occupation:   IADL Comments: sleep in flat bed, tall  Additional Comments: Pt plans to stay on first floor. Writer educates pt to stay on first floor until home health progresses pt to 2nd floor.  works 1 day/week, daughter will be over when  is working. Cognition        Objective          AROM RLE (degrees)  RLE AROM: WFL  RLE General AROM: R knee AROM: 0-89  AROM LLE (degrees)  LLE AROM : WNL  AROM RUE (degrees)  RUE General AROM: See OT  AROM LUE (degrees)  LUE General AROM: See OT  Strength RLE  Strength RLE: WFL  Comment: Grossly 3+ to 4-/5  Strength LLE  Strength LLE: WNL  Comment: Grossly 4- to 4/5  Strength RUE  Comment: See OT  Strength LUE  Comment: See OT     Sensation  Overall Sensation Status: WFL(pt denies at this time - intermittent numbness B hands)  Bed mobility  Comment: No formal bed mobility assessed. Pt plans to sleep in recliner at home. Pt educated to not position pillow directly under knee. Transfers  Sit to Stand: Minimal Assistance;Contact guard assistance  Stand to sit: Minimal Assistance;Contact guard assistance  Bed to Chair: Contact guard assistance  Stand Pivot Transfers: Contact guard assistance  Comment: Pt initially min x1 to transfer from recliner, progressing to CGA throughout session. Pt performs multiple transfers min to CGA x1 with RW - toilet, side of bed, w/c, recliner.    Ambulation  Ambulation?: Yes  WB Status: Full WBAT RLE  Ambulation 1  Surface: level tile  Device: Rolling Walker  Assistance: Contact guard assistance  Quality of Gait: antalgic gait, slow isidoro, decreased stance time R LE  Gait Deviations: Decreased step length; Slow Teresita  Distance: 15' x2, 10'x2  Comments: Pt ambulates short distances with increased time. Improved mobility throughout session. Reduced stiffness and pain toward end of session. Ice applied with B LE elevated. Stairs/Curb  Stairs?: Yes  Stairs  # Steps : 3(ascend/descend)  Stairs Height: 4\"  Device: Single pt cane  Assistance: Contact guard assistance  Comment: Pt educated on technique with good carryover. Pt using R UE on rail to simulate door frame. Good technique used with  present for family training to use gait belt and positioning for safety. Balance  Posture: Good  Sitting - Static: Good  Sitting - Dynamic: Good  Standing - Static: Good;-  Standing - Dynamic: Fair;+;Good;-  Comments: Low fall risk, standing balance with RW  Other exercises  Other exercises?: Yes  Other exercises 1: HEP R LE x10-15 reps with handout given  Other exercises 2: Education (don/doff, on/off time) on AIRCAST ice pack with      Plan   Plan  Times per week: BID  Specific instructions for Next Treatment: progress gait/stairs, go over HEP, R knee AROM  Current Treatment Recommendations: Strengthening, ROM, Balance Training, Functional Mobility Training, Transfer Training, Endurance Training, Gait Training, Stair training, Equipment Evaluation, Education, & procurement, Patient/Caregiver Education & Training, Safety Education & Training, Home Exercise Program, Positioning  Safety Devices  Type of devices:  All fall risk precautions in place, Call light within reach, Gait belt, Patient at risk for falls, Left in chair, Nurse notified(RN Keith Litten)    G-Code       OutComes Score                                                  AM-PAC Score  AM-PAC Inpatient Mobility Raw Score : 14 (09/15/20 1330)  AM-PAC Inpatient T-Scale Score : 38.1 (09/15/20 1330)  Mobility Inpatient CMS 0-100% Score: 61.29 (09/15/20 1330)  Mobility Inpatient CMS G-Code Modifier : CL (09/15/20 1330) Goals  Short term goals  Time Frame for Short term goals: 1 treatment  Short term goal 1: Pt to demonstrate SBA bed mobility. Short term goal 2: Pt to demonstrate SBA transfers  Short term goal 3: Pt to amb 50'-100' SBA. Short term goal 4: Pt to ascend/descend 3 stairs SBA. Short term goal 5: Pt to improve R knee AROM: 0-95 to improve ease of mobility. Patient Goals   Patient goals :  To go home       Therapy Time   Individual Concurrent Group Co-treatment   Time In 0128         Time Out 1433         Minutes 61         Timed Code Treatment Minutes: Venus 9 Papa Elliott, PT

## 2020-09-16 ENCOUNTER — TELEPHONE (OUTPATIENT)
Dept: ORTHOPEDIC SURGERY | Age: 75
End: 2020-09-16

## 2020-09-25 RX ORDER — OXYCODONE HYDROCHLORIDE AND ACETAMINOPHEN 5; 325 MG/1; MG/1
1-2 TABLET ORAL EVERY 4 HOURS PRN
Qty: 42 TABLET | Refills: 0 | Status: SHIPPED | OUTPATIENT
Start: 2020-09-25 | End: 2020-09-28 | Stop reason: SDUPTHER

## 2020-09-28 ENCOUNTER — OFFICE VISIT (OUTPATIENT)
Dept: ORTHOPEDIC SURGERY | Age: 75
End: 2020-09-28

## 2020-09-28 PROCEDURE — 99024 POSTOP FOLLOW-UP VISIT: CPT | Performed by: ORTHOPAEDIC SURGERY

## 2020-09-28 RX ORDER — OXYCODONE HYDROCHLORIDE AND ACETAMINOPHEN 5; 325 MG/1; MG/1
1-2 TABLET ORAL EVERY 4 HOURS PRN
Qty: 42 TABLET | Refills: 0 | Status: SHIPPED | OUTPATIENT
Start: 2020-09-28 | End: 2020-10-05

## 2020-09-28 NOTE — PROGRESS NOTES
Raven Larios M.D.            [address] [facility], [address] [facility],[address], [address] [facility]           Dept Phone: [phone]           Dept Fax:  [phone]           [place]          Dept Phone: [phone]           Dept Fax:  [phone]      Chief Compliant:  Chief Complaint   Patient presents with    Post-Op Check     Rt TKA 9/15/20        History of Present Illness:  Patient returns today status post right TKA times 2 weeks. Patient has no major complaints     Review of Systems   Constitutional: Negative for fever, chills, sweats, recent injury, recent illness  Neurological: Negative for Headaches, numbness, or weakness. Integumentary: Negative for rash, itching, ecchymosis, or wounds. Musculoskeletal: Positive for Post-Op Check (Rt TKA 9/15/20)       Physical Exam:  Constitutional: Patient is oriented to person, place, and time. Patient appears well-developed and well nourished. Musculoskeletal: Normal gait. Motion 0-100 degrees with expected pain with ROM. No Calf tenderness, Negative Irina's sign. Neurovascular intact. Neurological: Patient is alert and oriented to person, place, and time. Normal strenght. No sensory deficit. Skin: Skin is warm and dry. Incision is healing well without signs of redness or drainage  Nursing note and vitals reviewed. Labs and Imaging:     XR taken today:  Xr Knee Right (1-2 Views)    Result Date: 9/28/2020  X-rays taken today reviewed by me show standing AP of both knees and a lateral the right knee. Patient has had a recent right total knee arthroplasty and the components appear to be in good position of both AP and lateral views.   Patient had a previous left total knee arthroplasty the components appear to be good         Orders Placed This Encounter   Procedures   Myron Knox 81. Referral Priority:   Routine     Referral Type:   Eval and Treat     Referral Reason:   Specialty Services Required     Requested Specialty:   Physical Therapy     Number of Visits Requested:   1       Assessment and Plan:  1. Status post total right knee replacement    2. Primary osteoarthritis of right knee        2 weeks status post right TKA        This is a 66 y.o. female who presents to the clinic today status post right TKA. Continue anticoagulation. Transition to outpatient Pt. Restrictions given. RTO 5-6 weeks. Call if any problems/issues prior to that       Provider Attestation:  Mary Patiño, personally performed the services described in this documentation. All medical record entries made by the scribe were at my direction and in my presence. I have reviewed the chart and discharge instructions and agree that the records reflect my personal performance and is accurate and complete. Paula Carrasquillo MD. 09/28/20        Please note that this chart was generated using voice recognition Dragon dictation software. Although every effort was made to ensure the accuracy of this automated transcription, some errors in transcription may have occurred.

## 2020-10-07 ENCOUNTER — HOSPITAL ENCOUNTER (OUTPATIENT)
Dept: PHYSICAL THERAPY | Age: 75
Setting detail: THERAPIES SERIES
Discharge: HOME | End: 2020-10-07
Payer: MEDICARE

## 2020-10-07 PROCEDURE — 97016 VASOPNEUMATIC DEVICE THERAPY: CPT

## 2020-10-07 PROCEDURE — 97161 PT EVAL LOW COMPLEX 20 MIN: CPT

## 2020-10-07 PROCEDURE — 97110 THERAPEUTIC EXERCISES: CPT

## 2020-10-07 ASSESSMENT — PAIN DESCRIPTION - DESCRIPTORS: DESCRIPTORS: ACHING;SHARP;SHOOTING

## 2020-10-07 ASSESSMENT — PAIN SCALES - GENERAL: PAINLEVEL_OUTOF10: 4

## 2020-10-07 ASSESSMENT — PAIN DESCRIPTION - LOCATION: LOCATION: KNEE

## 2020-10-07 ASSESSMENT — PAIN DESCRIPTION - PROGRESSION: CLINICAL_PROGRESSION: GRADUALLY IMPROVING

## 2020-10-07 ASSESSMENT — PAIN DESCRIPTION - ORIENTATION: ORIENTATION: RIGHT;ANTERIOR;POSTERIOR

## 2020-10-07 ASSESSMENT — PAIN DESCRIPTION - FREQUENCY: FREQUENCY: INTERMITTENT

## 2020-10-07 ASSESSMENT — PAIN DESCRIPTION - PAIN TYPE: TYPE: ACUTE PAIN;SURGICAL PAIN

## 2020-10-07 NOTE — PROGRESS NOTES
Physical Therapy  Initial Assessment  Date: 10/7/2020  Patient Name: Sarah Ramsey  MRN: [id number]  : 1945     Treatment Diagnosis: difficulty walking R26.2    Subjective   General  Additional Pertinent Hx: L TKA , arthritis  Referring Practitioner: Jeannette Ritter  Referral Date : 10/07/20  Diagnosis: R knee OA s/p TKR 20  Follows Commands: Within Functional Limits  General Comment  Comments: Only took 2 percocet yesterday. PT Visit Information  Onset Date: 20  PT Insurance Information: Medicare  Total # of Visits Approved: 18  Total # of Visits to Date: 1  Progress Note Due Date: 10/21/20  Subjective  Subjective: R knee stiffness and decreased ROM after knee replacement. Had home PT for two weeks. Bought a LE bike and uses it frequently. Pain Screening  Patient Currently in Pain: Yes(took Percocet before she came today)  Pain Assessment  Pain Assessment: 0-10  Pain Level: 4  Patient's Stated Pain Goal: No pain  Pain Type: Acute pain;Surgical pain(post op)  Pain Location: Knee  Pain Orientation: Right; Anterior;Posterior  Pain Descriptors: Aching; Vickii Diaz; Shooting  Pain Frequency: Intermittent  Clinical Progression: Gradually improving  Vital Signs  Patient Currently in Pain: Yes(took Percocet before she came today)  Patient Observation  Observations: Noted edema RLE      Orientation  Orientation  Overall Orientation Status: Within Functional Limits    Social/Functional History  Social/Functional History  Lives With: Spouse  Type of Home: House  Home Layout: Two level;Bed/Bath upstairs(Has been living on first floor with recliner to sleep. )  Home Access: Stairs to enter without rails(cane and door jamb)  Entrance Stairs - Number of Steps: 2  Home Equipment: Rolling walker;Cane(quad cane)  Receives Help From: Family  ADL Assistance: Needs assistance  Dressing: Maximum assistance(SHAKA hose only for RLE)  Homemaking Assistance: Needs assistance  Meal Prep:  Total  Laundry: Total  Vacuuming: Total  Cleaning: Total  Driving: Total  Shopping: Total  Ambulation Assistance: Independent  Transfer Assistance: Independent  Leisure & Hobbies: silver sneakers ex classes- not since Covid  Additional Comments: can feel a difference since not going to ex classes    Objective     Observation/Palpation  Posture: Good  Palpation: R knee to ankle is hard to touch d/t edema  Edema: Noted edema RLE knee to ankle R ankle through malleolus 31.3 cm; L 25.3 cm(with radha hose and sock R, sock L)    PROM RLE (degrees)  RLE PROM: Exceptions  R Knee Flexion 0-145: 65(supine)  R Knee Extension 0: 10(towel roll under ankle)  AROM RLE (degrees)  RLE AROM: Exceptions  R Knee Flexion 0-145: 60(supine)  R Knee Extension 0: 10(supine)    Strength RLE  Strength RLE: Exception  Comment: unable to straighten R knee due to surgery, weak isometric quad L  Strength LLE  Strength LLE: WFL        Sensation  Overall Sensation Status: WFL     Transfers  Sit to Stand: Independent  Stand to sit: Independent       Ambulation  Ambulation?: Yes  Ambulation 1  Surface: level tile  Device: Rolling Walker  Assistance: Independent  Distance: starting to walk in community with RW ie to coffee house  Comments: TUG 21 sec with RW, dec stance time R, short step L  Balance  Posture: Good  Exercises  Exercise 1: Nu Step L2 4 min  Exercise 2: SAQ R with towel roll under knee to facilitate quad contraction x 10  Exercise 3: Verbal ed to continue ankle pumps/circles multiple times at home  Exercise 4: Add AA SLR R  Exercise 5: Add slant board stretcj  Exercise 6: Add mini squats in // bars  Exercise 7: Add standing marches     Assessment   Conditions Requiring Skilled Therapeutic Intervention  Body structures, Functions, Activity limitations: Decreased functional mobility ; Decreased ADL status; Decreased ROM; Decreased strength;Decreased endurance  Assessment: 66 yr old female s/p R TKR for arthritis would benefit from skilled therapy to improve ROM and strength R LE to return to previous acitvity level  Treatment Diagnosis: difficulty walking R26.2  Prognosis: Good  Decision Making: Low Complexity  History: no limiting factors  Exam: ROM, MMT, transfers, gait, TUG, LEFS  Clinical Presentation: stable  Treatment Initiated : Nu step, VASO. exs  Activity Tolerance  Activity Tolerance: Patient limited by pain         Plan  Patient Goals: No pain and full movement R knee. Comments/Assessment:    Rehab Potential:  [x] Good  [] Fair  [] Poor   Suggested Professional Referral:  [x] No  [] Yes:  Barriers to Goal Achievement:  [x] No  [] Yes:  Domestic Concerns:  [x] No  [] Yes:    Treatment Plan:  [x] Therapeutic Exercise   46187  [] Iontophoresis: 4 mg/mL Dexamethasone Sodium Phosphate  mAmin  28274   [] Therapeutic Activity  69388 [x] Vasopneumatic cold with compression  44615    [x] Gait Training   13144 [] Ultrasound   47771   [] Neuromuscular Re-education  41616 [] Electrical Stimulation Unattended  06934   [x] Manual Therapy  43604 [] Electrical Stimulation Attended  31438   [x] Instruction in HEP  [] Lumbar/Cervical Traction  54611   [] Aquatic Therapy   52421 [] Cold/hotpack    [] Massage   56491      [] Dry Needling, 1 or 2 muscles  55818   [] Biofeedback, first 15 minutes   49118  [] Biofeedback, additional 15 minutes   72777 [] Dry Needling, 3 or more muscles  69073      Frequency:    2-3       X/wk x   4-6       wk's      [x] Plans/Goals, Risk/Benefits discussed with pt/family  Comprehension of Education [x] yes  [] Needs Review  Pt/Family Education: [x] Verbal  [x] Demo  [] Written    Pt will be on vacation October 23 - Nov 1. OutComes Score  LEFS Total Score: 30 (10/07/20 1525)  29/64 (not all activities appropriate) = 45  Goals  Short term goals  Time Frame for Short term goals: 7 visits  Short term goal 1: 0-90 R knee ROM supine  Short term goal 2: Decrease R ankle edema by 2 cm  Short term goal 3: Ambulate 100 ft with str cane  Short term goal 4:  Indep

## 2020-10-08 ENCOUNTER — HOSPITAL ENCOUNTER (OUTPATIENT)
Dept: PHYSICAL THERAPY | Age: 75
Setting detail: THERAPIES SERIES
Discharge: HOME | End: 2020-10-08
Payer: MEDICARE

## 2020-10-08 PROCEDURE — 97016 VASOPNEUMATIC DEVICE THERAPY: CPT

## 2020-10-08 PROCEDURE — 97110 THERAPEUTIC EXERCISES: CPT

## 2020-10-08 ASSESSMENT — PAIN SCALES - GENERAL: PAINLEVEL_OUTOF10: 1

## 2020-10-08 ASSESSMENT — PAIN DESCRIPTION - LOCATION: LOCATION: KNEE

## 2020-10-08 ASSESSMENT — PAIN DESCRIPTION - ORIENTATION: ORIENTATION: RIGHT;ANTERIOR

## 2020-10-08 ASSESSMENT — PAIN DESCRIPTION - DESCRIPTORS: DESCRIPTORS: OTHER (COMMENT)

## 2020-10-08 ASSESSMENT — PAIN DESCRIPTION - PAIN TYPE: TYPE: ACUTE PAIN;SURGICAL PAIN

## 2020-10-08 ASSESSMENT — PAIN DESCRIPTION - PROGRESSION: CLINICAL_PROGRESSION: GRADUALLY IMPROVING

## 2020-10-08 ASSESSMENT — PAIN DESCRIPTION - FREQUENCY: FREQUENCY: INTERMITTENT

## 2020-10-08 NOTE — PROGRESS NOTES
Physical Therapy  Daily Treatment Note  Date: 10/8/2020  Patient Name: Jennifer Arias  MRN: [id number]     :   1945    Subjective:   General  Additional Pertinent Hx: L TKA , arthritis  Referring Practitioner: Liya Stein  PT Visit Information  Onset Date: 20  PT Insurance Information: Medicare  Total # of Visits Approved: 18  Total # of Visits to Date: 2  Progress Note Due Date: 10/21/20  Subjective  Subjective: I was tired after therapy yesterday and slept well. I took tylenol this morning and a percocet about 2 hours ago. I feel pretty good. Put SHAKA hose on first thing this morning. Ankle not as swollen as it was. Pain Screening  Patient Currently in Pain: Yes  Pain Assessment  Pain Assessment: 0-10  Pain Level: 1  Patient's Stated Pain Goal: No pain  Pain Type: Acute pain;Surgical pain(post op)  Pain Location: Knee  Pain Orientation: Right; Anterior  Pain Descriptors: Other (Comment)(pinching- brief)  Pain Frequency: Intermittent  Clinical Progression: Gradually improving  Vital Signs  Patient Currently in Pain: Yes       Treatment Activities:         Exercises  Exercise 1: Nu Step L2 8 min  Exercise 2: SAQ R with towel roll under knee to facilitate quad contraction 3x 10 with vc  Exercise 3: Verbal ed to continue ankle pumps/circles multiple times at home  Exercise 4: AA SLR R 2 x 10  Exercise 5: slant board stretch 3x30\"  Exercise 6: mini squats in // bars x 15  Exercise 7: standing marches x 15  Exercise 8: heel toe raise in // bars x 15  Exercise 9: SAQ small bolster 2x10 R knee  Exercise 10: gentle patellar mobes R x 5 min     Modalities  Cryotherapy (Minutes\Location): VASO R knee on wedge, towel roll under knee 15 min, low pressure 34*         Assessment:   Conditions Requiring Skilled Therapeutic Intervention  Body structures, Functions, Activity limitations: Decreased functional mobility ; Decreased ADL status; Decreased ROM; Decreased strength;Decreased endurance  Assessment: Improved quad control today  Treatment Diagnosis: difficulty walking R26.2  Activity Tolerance  Activity Tolerance: Patient Tolerated treatment well  Comments: Increased ex's to improve strength/balance/ROM      Goals:  Short term goals  Time Frame for Short term goals: 7 visits  Short term goal 1: 0-90 R knee ROM supine  Short term goal 2: Decrease R ankle edema by 2 cm  Short term goal 3: Ambulate 100 ft with str cane  Short term goal 4: Indep HEP  Long term goals  Time Frame for Long term goals : 18 visits  Long term goal 1: Full R knee AROM  Long term goal 2: MMT R KNEE 4+/5 (No quad lag with R SLR)  Long term goal 3: LEFS score < 10 % limitation to return to full previous activity level  Patient Goals   Patient goals : Full ROM R knee, no pain    Plan:  Progress R TKR exs as tolerated. Wean from walker as able.            Treatment Charges: Minutes Units   []  Ultrasound     []  Electrical-Stim     []  Iontophoresis     []  Traction     []  Massage       []  Eval     []  Gait     [x]  Ther Exercise 38  3   []  Manual Therapy       []  Ther Activities       []  Aquatics     []  Vasopneumatic Device 15 1   []  Neuro Re-Ed       []  Other       Total Treatment Time:            Therapy Time   Individual Concurrent Group Co-treatment   Time In 1630         Time Out 1725         Minutes [address] [facility] Inés Mabry, PT

## 2020-10-12 ENCOUNTER — HOSPITAL ENCOUNTER (OUTPATIENT)
Dept: PHYSICAL THERAPY | Age: 75
Setting detail: THERAPIES SERIES
Discharge: HOME | End: 2020-10-12
Payer: MEDICARE

## 2020-10-12 ENCOUNTER — HOSPITAL ENCOUNTER (OUTPATIENT)
Dept: WOMENS IMAGING | Age: 75
Discharge: HOME | End: 2020-10-14
Payer: MEDICARE

## 2020-10-12 PROCEDURE — 77063 BREAST TOMOSYNTHESIS BI: CPT

## 2020-10-12 PROCEDURE — 97016 VASOPNEUMATIC DEVICE THERAPY: CPT

## 2020-10-12 PROCEDURE — 97110 THERAPEUTIC EXERCISES: CPT

## 2020-10-12 PROCEDURE — 97140 MANUAL THERAPY 1/> REGIONS: CPT

## 2020-10-12 ASSESSMENT — PAIN DESCRIPTION - LOCATION: LOCATION: KNEE

## 2020-10-12 ASSESSMENT — PAIN SCALES - GENERAL: PAINLEVEL_OUTOF10: 3

## 2020-10-12 ASSESSMENT — PAIN DESCRIPTION - FREQUENCY: FREQUENCY: INTERMITTENT

## 2020-10-12 ASSESSMENT — PAIN DESCRIPTION - PROGRESSION: CLINICAL_PROGRESSION: GRADUALLY IMPROVING

## 2020-10-12 ASSESSMENT — PAIN DESCRIPTION - PAIN TYPE: TYPE: ACUTE PAIN

## 2020-10-12 ASSESSMENT — PAIN DESCRIPTION - ORIENTATION: ORIENTATION: RIGHT

## 2020-10-12 NOTE — PROGRESS NOTES
Physical Therapy  Daily Treatment Note  Date: 10/12/2020  Patient Name: Deny Orr  MRN: [id number]     :   1945    Subjective:   General  Additional Pertinent Hx: L TKA , arthritis  Referring Practitioner: Jose Smith  PT Visit Information  Onset Date: 20  PT Insurance Information: Medicare  Total # of Visits Approved: 18  Total # of Visits to Date: 3  Progress Note Due Date: 10/21/20  Subjective  Subjective: Mild R knee achiness from weekend   Pain Screening  Patient Currently in Pain: Yes  Pain Assessment  Pain Assessment: 0-10  Pain Level: 3  Patient's Stated Pain Goal: No pain  Pain Type: Acute pain  Pain Location: Knee  Pain Orientation: Right  Pain Frequency: Intermittent  Clinical Progression: Gradually improving  Vital Signs  Patient Currently in Pain: Yes       Treatment Activities:   Exercises  Exercise 1: Nu Step L2 8 min  Exercise 2: SAQ R with towel roll under knee to facilitate quad contraction 3x 10 with vc  Exercise 3: Verbal ed to continue ankle pumps/circles multiple times at home  Exercise 4: AA SLR R 2 x 10  Exercise 5: slant board stretch 3x30\"  Exercise 6: mini squats in // bars x 15  Exercise 7: standing marches x 15  Exercise 8: heel toe raise in // bars x 15  Exercise 9: SAQ small bolster 2x10 R knee  Exercise 10: gentle patellar mobes R x 5 min     Modalities  Cryotherapy (Minutes\Location): VASO R knee on wedge, towel roll under knee 15 min, low pressure 34* NT      Assessment:   Conditions Requiring Skilled Therapeutic Intervention  Body structures, Functions, Activity limitations: Decreased functional mobility ; Decreased ADL status; Decreased ROM; Decreased strength;Decreased endurance  Assessment: Improved quad control today  Treatment Diagnosis: difficulty walking R26.2  Activity Tolerance  Activity Tolerance: Patient Tolerated treatment well  Comments: Increased ex's to improve strength/balance/ROM     Goals:  Short term goals  Time Frame for Short term goals: 7 visits  Short term goal 1: 0-90 R knee ROM supine  Short term goal 2: Decrease R ankle edema by 2 cm  Short term goal 3: Ambulate 100 ft with str cane  Short term goal 4:  Indep HEP  Long term goals  Time Frame for Long term goals : 18 visits  Long term goal 1: Full R knee AROM  Long term goal 2: MMT R KNEE 4+/5 (No quad lag with R SLR)  Long term goal 3: LEFS score < 10 % limitation to return to full previous activity level  Patient Goals   Patient goals : Full ROM R knee, no pain    Plan:  Cont      Therapy Time   Individual Concurrent Group Co-treatment   Time In  1030         Time Out  1120         Minutes 50            Treatment Charges: Minutes Units   []  Ultrasound     []  Electrical-Stim     []  Iontophoresis     []  Traction     []  Massage       []  Eval     []  Gait     [x]  Ther Exercise 30 2    [x]  Manual Therapy  10  1   []  Ther Activities       []  Aquatics     []  Vasopneumatic Device     []  Neuro Re-Ed       [x]  Other ICE 10     Total Treatment Time: 48 3      Kristen Medrano, PTA

## 2020-10-14 ENCOUNTER — HOSPITAL ENCOUNTER (OUTPATIENT)
Dept: PHYSICAL THERAPY | Age: 75
Setting detail: THERAPIES SERIES
Discharge: HOME | End: 2020-10-14
Payer: MEDICARE

## 2020-10-14 PROCEDURE — 97016 VASOPNEUMATIC DEVICE THERAPY: CPT

## 2020-10-14 PROCEDURE — 97110 THERAPEUTIC EXERCISES: CPT

## 2020-10-14 ASSESSMENT — PAIN DESCRIPTION - FREQUENCY: FREQUENCY: INTERMITTENT

## 2020-10-14 ASSESSMENT — PAIN SCALES - GENERAL
PAINLEVEL_OUTOF10: 3
PAINLEVEL_OUTOF10: 3

## 2020-10-14 ASSESSMENT — PAIN DESCRIPTION - PROGRESSION
CLINICAL_PROGRESSION: GRADUALLY IMPROVING
CLINICAL_PROGRESSION: GRADUALLY IMPROVING

## 2020-10-14 ASSESSMENT — PAIN DESCRIPTION - PAIN TYPE
TYPE: ACUTE PAIN
TYPE: ACUTE PAIN

## 2020-10-14 ASSESSMENT — PAIN DESCRIPTION - LOCATION
LOCATION: KNEE
LOCATION: KNEE

## 2020-10-14 ASSESSMENT — PAIN DESCRIPTION - ORIENTATION
ORIENTATION: RIGHT
ORIENTATION: RIGHT

## 2020-10-14 NOTE — PROGRESS NOTES
Physical Therapy  Daily Treatment Note  Date: 10/14/2020  Patient Name: Aisha Hernandez  MRN: [id number]     :   1945    Subjective:   General  Chart Reviewed: Yes  Additional Pertinent Hx: L TKA , arthritis  Referring Practitioner: Rodney Ireland  PT Visit Information  Onset Date: 20  PT Insurance Information: Medicare  Total # of Visits Approved: 18  Total # of Visits to Date: 4  Progress Note Due Date: 10/21/20  Subjective  Subjective: Patient reported today that she tried sleeping in bed last night. Sleep was a struggle secondary to finding comfortable position for knee. Pain Screening  Patient Currently in Pain: Yes  Pain Assessment  Pain Assessment: 0-10  Pain Level: 3  Patient's Stated Pain Goal: No pain  Pain Type: Acute pain  Pain Location: Knee  Pain Orientation: Right  Pain Frequency: Intermittent  Clinical Progression: Gradually improving  Vital Signs  Patient Currently in Pain: Yes       Treatment Activities:      PROM RLE (degrees)  R Knee Flexion 0-145: 97  R Knee Extension 0: 4 from 0       Exercises  Exercise 1: Nu Step L2 8 min  Exercise 2: Quad Set 10x2 5\" hold, last set with overpressure  Exercise 4: AA SLR R 2 x 10  Exercise 5: slant board stretch 3x30\"  Exercise 6: mini squats in // bars x 15  Exercise 7: standing marches x 15  Exercise 8: heel toe raise in // bars x 15  Exercise 9: SAQ small bolster 2x10 R knee  Exercise 11: Step Ups 15x  Exercise 12: Heel Slides 10x 5\" hold     Modalities  Cryotherapy (Minutes\Location): VASO R knee on wedge, towel roll under knee 10 min, low pressure 34*    Assessment:   Conditions Requiring Skilled Therapeutic Intervention  Body structures, Functions, Activity limitations: Decreased functional mobility ; Decreased ADL status; Decreased ROM; Decreased strength;Decreased endurance  Assessment: Cont exercises per chart to improve ROM. Added heel slides and quad sets to improve ROM. PROM meausred after quad sets and heel slides.  vaso for pain/soreness post exercise. Treatment Diagnosis: difficulty walking R26.2      Goals:  Short term goals  Time Frame for Short term goals: 7 visits  Short term goal 1: 0-90 R knee ROM supine  Short term goal 2: Decrease R ankle edema by 2 cm  Short term goal 3: Ambulate 100 ft with str cane  Short term goal 4:  Indep HEP  Long term goals  Time Frame for Long term goals : 18 visits  Long term goal 1: Full R knee AROM  Long term goal 2: MMT R KNEE 4+/5 (No quad lag with R SLR)  Long term goal 3: LEFS score < 10 % limitation to return to full previous activity level  Patient Goals   Patient goals : Full ROM R knee, no pain    Plan:    Plan  Plan Comment: Cont per POC  Timed Code Treatment Minutes: 50 Minutes     Therapy Time   Individual Concurrent Group Co-treatment   Time In 4664         Time Out 2912         Minutes 69         Timed Code Treatment Minutes: 50 Minutes      Treatment Charges: Minutes Units   []  Ultrasound     []  Electrical-Stim     []  Iontophoresis     []  Traction     []  Massage       []  Eval     []  Gait     [x]  Ther Exercise 50  3   []  Manual Therapy       []  Ther Activities       []  Aquatics     [x]  Vasopneumatic Device 10 1   []  Neuro Re-Ed       []  Other       Total Treatment Time: 60 4           China Gorman, PTA

## 2020-10-19 ENCOUNTER — HOSPITAL ENCOUNTER (OUTPATIENT)
Dept: PHYSICAL THERAPY | Age: 75
Setting detail: THERAPIES SERIES
Discharge: HOME | End: 2020-10-19
Payer: MEDICARE

## 2020-10-19 PROCEDURE — 97110 THERAPEUTIC EXERCISES: CPT

## 2020-10-19 PROCEDURE — 97016 VASOPNEUMATIC DEVICE THERAPY: CPT

## 2020-10-19 ASSESSMENT — PAIN DESCRIPTION - ORIENTATION: ORIENTATION: RIGHT

## 2020-10-19 ASSESSMENT — PAIN DESCRIPTION - PAIN TYPE: TYPE: ACUTE PAIN

## 2020-10-19 ASSESSMENT — PAIN DESCRIPTION - PROGRESSION: CLINICAL_PROGRESSION: GRADUALLY IMPROVING

## 2020-10-19 ASSESSMENT — PAIN DESCRIPTION - LOCATION: LOCATION: KNEE

## 2020-10-19 ASSESSMENT — PAIN SCALES - GENERAL: PAINLEVEL_OUTOF10: 4

## 2020-10-21 ENCOUNTER — HOSPITAL ENCOUNTER (OUTPATIENT)
Dept: PHYSICAL THERAPY | Age: 75
Setting detail: THERAPIES SERIES
Discharge: HOME | End: 2020-10-21
Payer: MEDICARE

## 2020-10-21 PROCEDURE — 97110 THERAPEUTIC EXERCISES: CPT

## 2020-10-21 PROCEDURE — 97016 VASOPNEUMATIC DEVICE THERAPY: CPT

## 2020-10-21 ASSESSMENT — PAIN DESCRIPTION - PAIN TYPE: TYPE: SURGICAL PAIN

## 2020-10-21 ASSESSMENT — PAIN SCALES - GENERAL: PAINLEVEL_OUTOF10: 4

## 2020-10-21 ASSESSMENT — PAIN DESCRIPTION - LOCATION: LOCATION: KNEE

## 2020-10-21 ASSESSMENT — PAIN DESCRIPTION - ORIENTATION: ORIENTATION: RIGHT

## 2020-10-22 ENCOUNTER — HOSPITAL ENCOUNTER (OUTPATIENT)
Dept: PHYSICAL THERAPY | Age: 75
Setting detail: THERAPIES SERIES
Discharge: HOME | End: 2020-10-22
Payer: MEDICARE

## 2020-10-22 PROCEDURE — 97016 VASOPNEUMATIC DEVICE THERAPY: CPT

## 2020-10-22 PROCEDURE — 97110 THERAPEUTIC EXERCISES: CPT

## 2020-10-22 ASSESSMENT — PAIN SCALES - GENERAL: PAINLEVEL_OUTOF10: 2

## 2020-10-22 ASSESSMENT — PAIN DESCRIPTION - LOCATION: LOCATION: KNEE

## 2020-10-22 ASSESSMENT — PAIN DESCRIPTION - PAIN TYPE: TYPE: SURGICAL PAIN

## 2020-10-22 ASSESSMENT — PAIN DESCRIPTION - ORIENTATION: ORIENTATION: RIGHT

## 2020-10-22 ASSESSMENT — PAIN DESCRIPTION - PROGRESSION: CLINICAL_PROGRESSION: GRADUALLY IMPROVING

## 2020-10-22 NOTE — PROGRESS NOTES
Physical Therapy  [address]   Outpatient Physical Therapy  [address] [facility]. [address]  Phone: [phone]  Fax: [phone]  Daily Progress Note    Date: 10/22/20    Patient Name: Tha Marroquin        MRN: [id number]  Account: [de-identified] : 1945      General Information:  Additional Pertinent Hx: L TKA , arthritis  Referring Practitioner: Jim Gonsalez  Referral Date : 10/07/20  Diagnosis: R knee OA s/p TKR 20  Follows Commands: Within Functional Limits  Other (Comment): Medicare charges in navigator  Onset Date: 20  PT Insurance Information: Medicare  Total # of Visits Approved: 18  Total # of Visits to Date: 7  No Show: 0  Progress Note Due Date: 20  Canceled Appointment: 1    Subjective:  Subjective: Pt reports being more stiff today. Thinks it may be the change in weather. Reports she was also unable to do most of her exercises over the weekend due to surgery on her L foot. Pain:  Patient Currently in Pain: Yes  Pain Assessment: 0-10  Pain Level: 2  Pain Type: Surgical pain  Pain Location: Knee  Pain Orientation: Right  Clinical Progression: Gradually improving       Objective:  Exercise 1: Nu Step L2 9 min  Exercise 2: Quad Set 10x 5\" hold, last set with overpressure  Exercise 4: SLR R 2 x 10  Exercise 5: slant board stretch 3x30\"  Exercise 6: mini squats in // bars x 15  Exercise 7: standing marches x 15 WITH NO UE SUPPORT  Exercise 8: heel toe raise in // bars x 15(touch UE support PRN)  Exercise 9: SAQ small bolster 2x10 R knee (LACKS FULL EXTENSION)  Exercise 10: gentle patellar mobes R x 5 min  Exercise 11: Step Ups F/L (to R) 6\" X15 EA(light touch with UE's PRN)  Exercise 12: Heel Slides 10x 5\" hold WITH OVER PRESSURE  Exercise 13: T BAND R TKE 20X -LIME     Cryotherapy (Minutes\Location): VASO R knee on wedge, towel roll under knee 15 min, low pressure 34*    Comment:  Comments: continues using wheeled walker. post op shoe on L foot.  Using SC more around the house    Assessment: Body structures, Functions, Activity limitations: Decreased functional mobility ; Decreased ADL status; Decreased ROM; Decreased strength;Decreased endurance  Assessment: Cont exercise per chart to increase RLE strength and ROM. Patient cont to have limitations in end range extension and flexion. Vaso for pain/soreness post exercise.   Treatment Diagnosis: difficulty walking R26.2  Prognosis: Good  REQUIRES PT FOLLOW UP: Yes  Activity Tolerance: Patient Tolerated treatment well    Plan:  Plan: Continue with current plan    Therapy Time:  Time In: 1247  Time Out: 4223  Minutes: 60       Treatment Charges: Minutes Units   []  Ultrasound     []  Electrical-Stim     []  Iontophoresis     []  Traction     []  Massage       []  Eval     []  Gait     [x]  Vasopneumatic Device 15 1   [x]  Ther Exercise 45 3   []  Manual Therapy       []  Ther Activities       []  Aquatics     []  Neuro Re-Ed       []  Other       Total Treatment Time: 61 4       Nieves Whaley, PT

## 2020-10-22 NOTE — PROGRESS NOTES
[facility] Outpatient Physical Therapy  [address] [place] [address]. [address]         Phone: [phone]       Fax: [phone]          Physical Therapy  PROGRESS NOTE/DAILY TREATMENT NOTE  Date: 10/21/2020  Patient Name: Sarah Ramsey  MRN:   [id number]     :   1945     10/21/20 0940   General   Additional Pertinent Hx L TKA , arthritis   Referring Practitioner CHERYL Callejas   Referral Date  10/07/20   Diagnosis R knee OA s/p TKA 20   Follows Commands WFL   Other (Comment) Medicare charges in navigator   PT Visit Information   Onset Date 20   PT Insurance Information Medicare   Total # of Visits Approved 18   Total # of Visits to Date 6   Progress Note Due Date 20   Canceled Appointment 1   Subjective   Subjective PATIENT REPORTS SHE IS STILL HAVING STIFFNESS AND PAIN IN HER KNEE MAKING IT DIFFICULT TO GO SIT TO STAND AND WALK.    General Comment   Comments ARRIVES USING [facility] WALKER   Pain Screening   Patient Currently in Pain Yes   Pain Assessment   Pain Level 4   Pain Type Surgical pain   Pain Location Knee   Pain Orientation Right   Exercises   Exercise 1 Nu Step L2 8 min   Exercise 2 Quad Set 10x 5\" hold, last set with overpressure   Exercise 4 SLR R 2 x 10   Exercise 5 slant board stretch 3x30\"   Exercise 6 mini squats in // bars x 15   Exercise 7 standing marches x 15 WITH LIGHT UE SUPPORT   Exercise 8 heel toe raise in // bars x 15   Exercise 9 SAQ small bolster 2x10 R knee (LACKS FULL EXTENSION)   Exercise 10 gentle patellar mobs R x 5 min   Exercise 11 Step Ups F/L 6\" X10 EA   Exercise 12 Heel Slides 10x 5\" hold WITH OVER PRESSURE   Exercise 13 T BAND TKE 20X -LIME   PROM RLE (degrees)   R Knee Flexion 0-145 105 DEGREES   R Knee Extension 0 0   AROM RLE (degrees)   R Knee Flexion 0-145 95   R Knee Extension 0 LACKS 5 DEGREES   Modalities   Cryotherapy (Minutes\Location) VASO R knee on wedge, towel roll under knee 15 min, low pressure 34*   Patient Goals    Patient goals Full ROM R knee, no pain   Short term goals   Time Frame for Short term goals 7 visits   Short term goal 1 0-90 R knee ROM supine (IMPROVED NOT MET)   Short term goal 2 Decrease R ankle edema by 2 cm (NOT TESTED)   Short term goal 3 Ambulate 100 ft with str cane (NOT MET BUT HAD FOOT SURGERY)   Short term goal 4 Indep HEP (MET)   Long term goals   Time Frame for Long term goals  18 visits   Long term goal 1 Full R knee AROM    Long term goal 2 MMT R KNEE 4+/5 (No quad lag with R SLR)   Long term goal 3 LEFS score < 10 % limitation to return to full previous activity level   Conditions Requiring Skilled Therapeutic Intervention   Body structures, Functions, Activity limitations Decreased functional mobility ; Decreased ADL status; Decreased ROM; Decreased strength;Decreased endurance   Assessment Cont exercise per chart to increase RLE strength and ROM. Patient cont to demonstrate improved ROM with exercises. Vaso for pain/soreness post exercise.    Treatment Diagnosis difficulty walking R26.2   Prognosis Good   REQUIRES PT FOLLOW UP Yes   Activity Tolerance   Activity Tolerance Patient Tolerated treatment well   Plan   Plan Continue with current plan   Frequency and duration of tx   Days 3   Weeks 6     TIME IN 1000, TIME OUT 1055    Treatment Charges: Minutes Units   []  Ultrasound     []  Electrical-Stim     []  Iontophoresis     []  Traction     []  Massage       []  Eval     []  Gait     [x]  Ther Exercise 40  3    []  Manual Therapy       []  Ther Activities       []  Aquatics     [x]  Vasopneumatic Device 15 1   []  Neuro Re-Ed       []  Other       Total Treatment Time: 54  4     Electronically signed by Lani Garcia PT on 10/22/2020 at 10:00 AM      Physical Therapy Plan of Care        Treatment Plan:  [x] Therapeutic Exercise    [] Modalities:  [] Therapeutic Activity    [] Ultrasound  [] Electrical Stimulation  [] Gait Training     [] Massage       [] Lumbar/Cervical Traction  [] Neuromuscular

## 2020-11-02 ENCOUNTER — HOSPITAL ENCOUNTER (OUTPATIENT)
Dept: PHYSICAL THERAPY | Age: 75
Setting detail: THERAPIES SERIES
Discharge: HOME | End: 2020-11-02
Payer: MEDICARE

## 2020-11-02 PROCEDURE — 97110 THERAPEUTIC EXERCISES: CPT

## 2020-11-02 PROCEDURE — 97016 VASOPNEUMATIC DEVICE THERAPY: CPT

## 2020-11-02 ASSESSMENT — PAIN DESCRIPTION - PROGRESSION: CLINICAL_PROGRESSION: GRADUALLY IMPROVING

## 2020-11-02 NOTE — PROGRESS NOTES
Physical Therapy  Daily Treatment Note  Date: 2020  Patient Name: Dena Neri  MRN: [id number]     :   1945    Subjective:   General  Chart Reviewed: Yes  Additional Pertinent Hx: L TKA , arthritis  Referring Practitioner: Sommer Menon  PT Visit Information  Onset Date: 20  PT Insurance Information: Medicare  Total # of Visits Approved: 18  Total # of Visits to Date: 8  No Show: 0  Canceled Appointment: 1  Subjective  Subjective: Patietn reports today with no new complaints. Noted that trip to [place] last week knee got very stiff in the long car ride to and from. Pain has been minimal; denies any today. Pain Screening  Patient Currently in Pain: Denies  Pain Assessment  Clinical Progression: Gradually improving  Vital Signs  Patient Currently in Pain: Denies       Treatment Activities:     Exercises  Exercise 1: Nu Step L2 9 min  Exercise 2: Quad Set 10x 5\" hold, last set with overpressure  Exercise 4: SLR R 2 x 10  Exercise 5: slant board stretch 3x30\"  Exercise 6: mini squats in // bars x 15  Exercise 7: standing marches x 15 WITH NO UE SUPPORT  Exercise 8: heel toe raise in // bars x 15  Exercise 9: SAQ small bolster 2x10 R knee (LACKS FULL EXTENSION)  Exercise 11: Step Ups F/L (to R) 6\" X15 EA  Exercise 13: T BAND R TKE 20X -LIME  Exercise 14: Step Downs 15x 6\" step    Assessment:   Conditions Requiring Skilled Therapeutic Intervention  Body structures, Functions, Activity limitations: Decreased functional mobility ; Decreased ADL status; Decreased ROM; Decreased strength;Decreased endurance  Assessment: Cont exercise per chart to increase RLE strength and ROM. Focused on improving knee extendion with quad sets. Vaso for soreness post exercises.   Treatment Diagnosis: difficulty walking R26.2  Prognosis: Good  Decision Making: Low Complexity  REQUIRES PT FOLLOW UP: Yes     Goals:  Short term goals  Time Frame for Short term goals: 7 visits  Short term goal 1: 0-90 R knee ROM supine (IMPROVED NOT MET)  Short term goal 2: Decrease R ankle edema by 2 cm (NOT TESTED)  Short term goal 3: Ambulate 100 ft with str cane (NOT MET BUT HAD FOOT SURGERY)  Short term goal 4:  Indep HEP (MET)  Long term goals  Time Frame for Long term goals : 18 visits  Long term goal 1: Full R knee AROM   Long term goal 2: MMT R KNEE 4+/5 (No quad lag with R SLR)  Long term goal 3: LEFS score < 10 % limitation to return to full previous activity level  Patient Goals   Patient goals : Full ROM R knee, no pain    Plan:    Plan  Plan Comment: Cont per POC  Timed Code Treatment Minutes: 60 Minutes     Therapy Time   Individual Concurrent Group Co-treatment   Time In 4662         Time Out 8846         Minutes 65         Timed Code Treatment Minutes: 60 Minutes      Treatment Charges: Minutes Units   []  Ultrasound     []  Electrical-Stim     []  Iontophoresis     []  Traction     []  Massage       []  Eval     []  Gait     [x]  Ther Exercise 45  3   []  Manual Therapy       []  Ther Activities       []  Aquatics     [x]  Vasopneumatic Device 15 1   []  Neuro Re-Ed       []  Other       Total Treatment Time: 60 4           Estefania Sanchez, LUIS MIGUEL

## 2020-11-04 ENCOUNTER — OFFICE VISIT (OUTPATIENT)
Dept: ORTHOPEDIC SURGERY | Age: 75
End: 2020-11-04

## 2020-11-04 ENCOUNTER — HOSPITAL ENCOUNTER (OUTPATIENT)
Dept: PHYSICAL THERAPY | Age: 75
Setting detail: THERAPIES SERIES
Discharge: HOME | End: 2020-11-04
Payer: MEDICARE

## 2020-11-04 PROCEDURE — 97140 MANUAL THERAPY 1/> REGIONS: CPT

## 2020-11-04 PROCEDURE — 99024 POSTOP FOLLOW-UP VISIT: CPT | Performed by: ORTHOPAEDIC SURGERY

## 2020-11-04 PROCEDURE — 97110 THERAPEUTIC EXERCISES: CPT

## 2020-11-04 ASSESSMENT — PAIN DESCRIPTION - PROGRESSION: CLINICAL_PROGRESSION: GRADUALLY IMPROVING

## 2020-11-04 NOTE — PROGRESS NOTES
Physical Therapy  Progress Note  Date: 2020  Patient Name: Dc Triana  MRN: [id number]     :   1945    Subjective:   General  Chart Reviewed: Yes  Referring Practitioner: David Varela  PT Visit Information  Onset Date: 20  PT Insurance Information: Medicare  Total # of Visits Approved: 18  Total # of Visits to Date: 9  No Show: 0  Canceled Appointment: 1  Subjective  Subjective: Patient denies pain, main complaint of stiffness on R knee, worse since after the 2 day car drive from [place]. Was on vacation for a week  and tried to do HEP. Pain Screening  Patient Currently in Pain: Denies  Pain Assessment  Clinical Progression: Gradually improving  Vital Signs  Patient Currently in Pain: Denies       Treatment Activities:    Ambulation 1  Surface: level tile  Device: Single point cane  Assistance: Independent  Distance: Came in wearing a Mill's shoe on L foot due to ingrown toenail and nail came off. Good heel toe pattern on R, no significant antalgia. Does not use cane for short distances inside the house   AROM RLE (degrees)  R Knee Flexion 0-145: R knee ext/ flex: -10/ 90 ; passive 100 deg flex ; L knee 110 with TKA  Strength RLE  Strength RLE: WFL    Exercises  Exercise 1: Nu Step L2 8 min  Exercise 2: Quad Set 10x 5\" hold, last set with overpressure  Exercise 3: Passive stretches in sitting, 10\"x 10  Exercise 5: slant board stretch 3x30\"  Exercise 6: mini squats in // bars x 15  Exercise 7: Fwd lunge on 6\" step, 15\"x3; hams stretch on 6\" step, 15\"x3  Exercise 9: SAQ small bolster 2x10 R knee  Exercise 10: gentle patellar mobs R ; jt. mob - A/P glides  Exercise 11: Step Ups F/L (to R) 6\" X15 EA  Exercise 13: T BAND R TKE 20X -LIME  Exercise 14: Step Downs 10x 6\" step   Assessment:   Conditions Requiring Skilled Therapeutic Intervention  Body structures, Functions, Activity limitations: Decreased functional mobility ; Decreased ADL status; Decreased ROM; Decreased strength;Decreased

## 2020-11-04 NOTE — PROGRESS NOTES
Della Marshall M.D.            [address] [facility], [address] [facility], [address] [facility]           Dept Phone: [phone]           Dept Fax:  [phone]           [place]          Dept Phone: [phone]           Dept Fax:  [phone]      Chief Compliant:  Chief Complaint   Patient presents with    Pain     Rt TKA 9/15/20        History of Present Illness: This is a 66 y.o. female who presents to the clinic today for evaluation / follow up of status post right total knee on 9/15/2020. She states that she had some issues with her other foot counter had her Ms. physical therapy. She was told by her therapist today that she needs to work on her motion. Otherwise she is not really have much complaints about pain. Review of Systems   Constitutional: Negative for fever, chills, sweats. Eyes: Negative for changes in vision, or pain. HENT: Negative for ear ache, epistaxis, or sore throat. Respiratory/Cardio: Negative for Chest pain, palpitations, SOB, or cough. Gastrointestinal: Negative for abdominal pain, N/V/D. Genitourinary: Negative for dysuria, frequency, urgency, or hematuria. Neurological: Negative for headache, numbness, or weakness. Integumentary: Negative for rash, itching, laceration, or abrasion. Musculoskeletal: Positive for Pain (Rt TKA 9/15/20)       Physical Exam:  Constitutional: Patient is oriented to person, place, and time. Patient appears well-developed and well nourished. HENT: Negative otherwise noted  Head: Normocephalic and Atraumatic  Nose: Normal  Eyes: Conjunctivae and EOM are normal  Neck: Normal range of motion Neck supple. Respiratory/Cardio: Effort normal. No respiratory distress. Musculoskeletal: Examination the patient today notes that her right knee wound is good completely healed.   She has motion near full extension but she gets little tight after about 95 degrees. No calf tenderness negative Homans  Neurological: Patient is alert and oriented to person, place, and time. Normal strenght. No sensory deficit. Skin: Skin is warm and dry  Psychiatric: Behavior is normal. Thought content normal.  Nursing note and vitals reviewed. Labs and Imaging:     XR taken today:  No results found. Orders Placed This Encounter   Procedures   [address]     Referral Priority:   Routine     Referral Type:   Eval and Treat     Referral Reason:   Specialty Services Required     Requested Specialty:   Physical Therapy     Number of Visits Requested:   1       Assessment and Plan:  1. Status post total right knee replacement    2. Primary osteoarthritis of right knee            This is a 66 y.o. female who presents to the clinic today for evaluation / follow up of right total knee x6 weeks. Past History:    Current Outpatient Medications:     escitalopram (LEXAPRO) 5 MG tablet, Take 5 mg by mouth daily, Disp: , Rfl:     aspirin EC 81 MG EC tablet, Take 1 tablet by mouth 2 times daily, Disp: 60 tablet, Rfl: 1    amoxicillin (AMOXIL) 500 MG capsule, 2 capsules one hour prior to dental or procedure flakito't then one cap twice daily until gone, Disp: 6 capsule, Rfl: 3    simvastatin (ZOCOR) 40 MG tablet, Take 40 mg by mouth nightly., Disp: , Rfl:     alendronate (FOSAMAX) 70 MG tablet, Take 70 mg by mouth every 7 days. , Disp: , Rfl:   No Known Allergies  Social History     Socioeconomic History    Marital status:      Spouse name: Not on file    Number of children: Not on file    Years of education: Not on file    Highest education level: Not on file   Occupational History    Not on file   Social Needs    Financial resource strain: Not on file    Food insecurity     Worry: Not on file     Inability: Not on file    Transportation needs     Medical: Not on file Non-medical: Not on file   Tobacco Use    Smoking status: Never Smoker    Smokeless tobacco: Never Used   Substance and Sexual Activity    Alcohol use: Yes     Comment: Social    Drug use: No    Sexual activity: Not on file   Lifestyle    Physical activity     Days per week: Not on file     Minutes per session: Not on file    Stress: Not on file   Relationships    Social connections     Talks on phone: Not on file     Gets together: Not on file     Attends Sikh service: Not on file     Active member of club or organization: Not on file     Attends meetings of clubs or organizations: Not on file     Relationship status: Not on file    Intimate partner violence     Fear of current or ex partner: Not on file     Emotionally abused: Not on file     Physically abused: Not on file     Forced sexual activity: Not on file   Other Topics Concern    Not on file   Social History Narrative    Not on file     Past Medical History:   Diagnosis Date    Arthritis     Hyperlipidemia     Wears glasses      Past Surgical History:   Procedure Laterality Date    COLONOSCOPY      PRE-MALIGNANT / BENIGN SKIN LESION EXCISION Right 11/16/2015    Rt cheek cyst with local flap closure    TOTAL KNEE ARTHROPLASTY Left 2015    TOTAL KNEE ARTHROPLASTY Right 9/15/2020    KNEE TOTAL ARTHROPLASTY WITH GPS performed by Marcos Simental MD at [facility] History   Problem Relation Age of Onset    Heart Attack Mother    Plan  Patient was advised that she needs to have an extension for physical therapy as she is really getting a little bit tight in flexion. Will get a try to avoid having her have a need a manipulation. We will see her back in 4 weeks for reevaluation      Provider Attestation:  Jackie Gutierrez, personally performed the services described in this documentation. All medical record entries made by the scribe were at my direction and in my presence.  I have reviewed the chart and discharge instructions and agree that the records reflect my personal performance and is accurate and complete. Brooklyn Rubio MD. 11/04/20      Please note that this chart was generated using voice recognition Dragon dictation software. Although every effort was made to ensure the accuracy of this automated transcription, some errors in transcription may have occurred.

## 2020-11-06 ENCOUNTER — HOSPITAL ENCOUNTER (OUTPATIENT)
Dept: PHYSICAL THERAPY | Age: 75
Setting detail: THERAPIES SERIES
Discharge: HOME | End: 2020-11-06
Payer: MEDICARE

## 2020-11-06 PROCEDURE — 97140 MANUAL THERAPY 1/> REGIONS: CPT

## 2020-11-06 PROCEDURE — 97110 THERAPEUTIC EXERCISES: CPT

## 2020-11-06 ASSESSMENT — PAIN DESCRIPTION - PROGRESSION: CLINICAL_PROGRESSION: GRADUALLY IMPROVING

## 2020-11-06 NOTE — PROGRESS NOTES
[address]   Outpatient Physical Therapy  [address] [facility]. [address]  Phone: [phone]  Fax: [phone]    Daily Progress Note    Date: 20    Patient Name: Luisa Nath        MRN: [id number]  Account: [de-identified] : 1945      General Information:  Referring Practitioner: Krishan Mojica  Referral Date : 10/07/20  Diagnosis: R knee OA s/p TKR 20  Follows Commands: Within Functional Limits  Other (Comment): 's appt. 2020  Onset Date: 20  PT Insurance Information: Medicare  Total # of Visits Approved: 18  Total # of Visits to Date: 10  No Show: 0  Canceled Appointment: 0    Subjective:  Subjective: Patient denies pain, just tenderness on medial knee after activity     Pain:  Patient Currently in Pain: Denies  Clinical Progression: Gradually improving       Objective:  Exercise 1: Nu Step L3 8 min  Exercise 2: Quad Set 10x 5\" hold, last set with overpressure  Exercise 3: Passive stretches in sitting, 10\"x 10  Exercise 5: slant board stretch 3x30\"  Exercise 6: mini squats in // bars x 15  Exercise 7: Fwd lunge on 6\" step, 15\"x3; hams stretch on 6\" step, 15\"x3  Exercise 9: SAQ small bolster 3x10 R knee  Exercise 10: gentle patellar mobs R ; jt. mob - A/P glides  Exercise 11: Step Ups F/L (to R) 6\" X15 EA  Exercise 12: Total gym, partial squats, 10x2  Exercise 13: T BAND R TKE 20X -LIME  Exercise 14: Step Downs 10x 6\" step - up with R, down with L      Assessment: Body structures, Functions, Activity limitations: Decreased ADL status; Decreased functional mobility ; Decreased ROM; Decreased strength;Decreased endurance  Assessment: Able to complete exercise program without increased symptoms, tolerating passive stretches better. Added standing knee flexion to HEP. Main difficulty is going up and down the steps.   Treatment Diagnosis: difficulty walking R26.2  Prognosis: Good  REQUIRES PT FOLLOW UP: Yes      Goals:  Patient Goals:  Patient goals : Full ROM R knee, no pain  Short Term Goals:  Time Frame for Short term goals: 7 visits  Short term goal 1: 0-90 R knee ROM supine - ONGOING  Short term goal 2: Decrease R ankle edema by 2 cm - MET ( NOW 26 CM )  Short term goal 3: Ambulate 100 ft with str cane - MET  Short term goal 4:  Indep HEP - (MET)  Long Term Goals:  Time Frame for Long term goals : 18 visits  Long term goal 1: Full R knee AROM - ONGOING  Long term goal 2: MMT R KNEE 4+/5 - ONGOING  Long term goal 3: LEFS score < 10 % limitation to return to full previous activity level - PARTLY MET ( NOW 40 = 50% DISABILITY )    Plan:     Times per week: 2 -3 x/wk for 18 visits  Current Treatment Recommendations: Strengthening;Home Exercise Program;ROM;Patient/Caregiver Education & Training;Manual Therapy - Joint Manipulation;Modalities  Plan Comment: Cont per POC, emphasize on improving ROM    Therapy Time:  Time In: 1000  Time Out: 1048  Minutes: 48  Timed Code Treatment Minutes: 40 Minutes    Treatment Charges: Minutes Units   []  Ultrasound     []  Electrical-Stim     []  Iontophoresis     []  Traction     []  Massage       []  Eval     []  Gait     [x]  Ther Exercise 25 2    [x]  Manual Therapy 15 1    []  Ther Activities       []  Aquatics     []  Neuro Re-Ed       []  Other       Total Treatment Time: 36 3        Abeba Crum, PT Electronically signed by Natacha Veliz PT on 11/6/2020 at 11:03 AM

## 2020-11-10 ENCOUNTER — HOSPITAL ENCOUNTER (OUTPATIENT)
Dept: PHYSICAL THERAPY | Age: 75
Setting detail: THERAPIES SERIES
Discharge: HOME | End: 2020-11-10
Payer: MEDICARE

## 2020-11-10 PROCEDURE — 97110 THERAPEUTIC EXERCISES: CPT

## 2020-11-10 PROCEDURE — 97140 MANUAL THERAPY 1/> REGIONS: CPT

## 2020-11-10 ASSESSMENT — PAIN DESCRIPTION - PROGRESSION: CLINICAL_PROGRESSION: GRADUALLY IMPROVING

## 2020-11-10 NOTE — PROGRESS NOTES
[address]   Outpatient Physical Therapy  [address] [facility]. [address]  Phone: [phone]  Fax: [phone]    Daily Progress Note    Date: 11/10/20    Patient Name: Jennifer Arias        MRN: [id number]  Account: [de-identified] : 1945      General Information:  Referring Practitioner: Liya Stein  Referral Date : 10/07/20  Diagnosis: R knee OA s/p TKR 20  Follows Commands: Within Functional Limits  Other (Comment): 's appt. 2020  Onset Date: 20  PT Insurance Information: Medicare  Total # of Visits Approved: 18  Total # of Visits to Date: 11  No Show: 0  Canceled Appointment: 0    Subjective:  Subjective: Patient reports doing well, has not been using her cane that much, has it in the car. Pain:  Patient Currently in Pain: Denies  Clinical Progression: Gradually improving       Objective:  Exercise 1: Nu Step L3 9 min  Exercise 2: Quad Set 10x 5\" hold, last set with overpressure  Exercise 3: Passive stretches in sitting, 10\"x 10; supine  Exercise 5: slant board stretch 3x30\"  Exercise 6: Cable walking, 4 ways, 7#, 3x@  Exercise 7: Fwd lunge on 6\" step, 15\"x3; hams stretch on 6\" step, 15\"x3  Exercise 10: gentle patellar mobs R ; jt. mob - A/P glides  Exercise 11: Step Ups F/L (to R) 6\" X15 EA  Exercise 12: Total gym, partial squats, 10x2  Exercise 13: T BAND R TKE 20X -LIME  Exercise 14: Step Downs 10x 6\" step - up with R, down with L   Assessment: Body structures, Functions, Activity limitations: Decreased ADL status; Decreased functional mobility ; Decreased ROM; Decreased strength;Decreased endurance  Assessment: Doing well with exercise program, emphasize in improving ROM. Added cable walking with contact guard initially to maintain balance for safety.   Treatment Diagnosis: difficulty walking R26.2  Prognosis: Good  REQUIRES PT FOLLOW UP: Yes   Goals:  Patient Goals:  Patient goals : Full ROM R knee, no pain  Short Term Goals:  Time Frame for Short term goals: 7 visits  Short term goal 1: 0-90 R knee ROM supine - ONGOING  Short term goal 2: Decrease R ankle edema by 2 cm - MET ( NOW 26 CM )  Short term goal 3: Ambulate 100 ft with str cane - MET  Short term goal 4:  Indep HEP - (MET)  Long Term Goals:  Time Frame for Long term goals : 18 visits  Long term goal 1: Full R knee AROM - ONGOING  Long term goal 2: MMT R KNEE 4+/5 - ONGOING  Long term goal 3: LEFS score < 10 % limitation to return to full previous activity level - PARTLY MET ( NOW 40 = 50% DISABILITY )  Plan:     Times per week: 2 -3 x/wk for 18 visits  Current Treatment Recommendations: Strengthening;Home Exercise Program;ROM;Patient/Caregiver Education & Training;Manual Therapy - Joint Manipulation;Modalities  Plan Comment: Cont per POC, emphasize on improving ROM    Therapy Time:  Time In: 1000  Time Out: 2881  Minutes: 57  Timed Code Treatment Minutes: 48 Minutes    Treatment Charges: Minutes Units   []  Ultrasound     []  Electrical-Stim     []  Iontophoresis     []  Traction     []  Massage       []  Eval     []  Gait     [x]  Ther Exercise 33  2    [x]  Manual Therapy 15   1   []  Ther Activities       []  Aquatics     []  Neuro Re-Ed       []  Other       Total Treatment Time: 50 3        Abeba Bower PT Electronically signed by Kenneth Saenz PT on 11/10/2020 at 11:05 AM

## 2020-11-11 ENCOUNTER — HOSPITAL ENCOUNTER (OUTPATIENT)
Dept: PHYSICAL THERAPY | Age: 75
Setting detail: THERAPIES SERIES
Discharge: HOME | End: 2020-11-11
Payer: MEDICARE

## 2020-11-11 PROCEDURE — 97110 THERAPEUTIC EXERCISES: CPT

## 2020-11-11 PROCEDURE — 97140 MANUAL THERAPY 1/> REGIONS: CPT

## 2020-11-11 ASSESSMENT — PAIN DESCRIPTION - PROGRESSION: CLINICAL_PROGRESSION: GRADUALLY IMPROVING

## 2020-11-11 NOTE — PROGRESS NOTES
[address]   Outpatient Physical Therapy  [address] [facility]. [address]  Phone: [phone]  Fax: [phone]    Daily Progress Note    Date: 20    Patient Name: Genesis Antonio        MRN: [id number]  Account: [de-identified] : 1945      General Information:  Referring Practitioner: Adwoa Brady  Referral Date : 10/07/20  Diagnosis: R knee OA s/p TKR 20  Follows Commands: Within Functional Limits  Other (Comment): 's appt. 2020  Onset Date: 20  PT Insurance Information: Medicare  Total # of Visits Approved: 18  Total # of Visits to Date: 12  No Show: 0  Canceled Appointment: 0    Subjective:  Subjective: Patient reports has been having occasional shooting pain on lateral knee. Pain:  Patient Currently in Pain: Denies  Clinical Progression: Gradually improving       Objective:  Exercise 1: Nu Step L3 8 min  Exercise 2: Quad Set 10x 5\" hold, last set with overpressure  Exercise 3: Passive stretches in sitting, 10\"x 10; supine  Exercise 5: slant board stretch 3x30\"  Exercise 6: Cable walking, 4 ways, 7#, 3x@  Exercise 7: Fwd lunge on 6\" step, 15\"x3; hams stretch on 6\" step, 15\"x3  Exercise 10: gentle patellar mobs R ; jt. mob - A/P glides  Exercise 11: Step Ups F/L (to R) 6\" X15 EA  Exercise 12: Total gym, partial squats, 10x2  Exercise 13: T BAND R TKE 20X -LIME  Exercise 14: Step Downs 10x 6\" step - up with R, down with L      Assessment: Body structures, Functions, Activity limitations: Decreased ADL status; Decreased functional mobility ; Decreased ROM; Decreased strength;Decreased endurance  Assessment: Continues to do well with exercise program, reports able to do steps slightly better, go up reciprocally but one step at a time going down.   Treatment Diagnosis: difficulty walking R26.2  Prognosis: Good  REQUIRES PT FOLLOW UP: Yes     Goals:  Patient Goals:  Patient goals : Full ROM R knee, no pain  Short Term Goals:  Time Frame for Short term goals: 7 visits  Short term goal 1: 0-90 R knee ROM supine - ONGOING  Short term goal 2: Decrease R ankle edema by 2 cm - MET ( NOW 26 CM )  Short term goal 3: Ambulate 100 ft with str cane - MET  Short term goal 4:  Indep HEP - (MET)  Long Term Goals:  Time Frame for Long term goals : 18 visits  Long term goal 1: Full R knee AROM - ONGOING  Long term goal 2: MMT R KNEE 4+/5 - ONGOING  Long term goal 3: LEFS score < 10 % limitation to return to full previous activity level - PARTLY MET ( NOW 40 = 50% DISABILITY )    Plan:     Times per week: 2 -3 x/wk for 18 visits  Current Treatment Recommendations: Strengthening;Home Exercise Program;ROM;Patient/Caregiver Education & Training;Manual Therapy - Joint Manipulation;Modalities  Plan Comment: Cont per POC, emphasize on improving ROM    Therapy Time:  Time In: 6950  Time Out: 1540  Minutes: 55  Timed Code Treatment Minutes: 45 Minutes    Treatment Charges: Minutes Units   []  Ultrasound     []  Electrical-Stim     []  Iontophoresis     []  Traction     []  Massage       []  Eval     []  Gait     [x]  Ther Exercise  30 2    [x]  Manual Therapy 15  1    []  Ther Activities       []  Aquatics     []  Neuro Re-Ed       []  Other       Total Treatment Time: 39 3        Ma  Lida Bryan, PT Electronically signed by Bismark Fischer PT on 11/11/2020 at 4:46 PM

## 2020-11-13 ENCOUNTER — HOSPITAL ENCOUNTER (OUTPATIENT)
Dept: PHYSICAL THERAPY | Age: 75
Setting detail: THERAPIES SERIES
Discharge: HOME | End: 2020-11-13
Payer: MEDICARE

## 2020-11-13 PROCEDURE — 97140 MANUAL THERAPY 1/> REGIONS: CPT

## 2020-11-13 PROCEDURE — 97110 THERAPEUTIC EXERCISES: CPT

## 2020-11-13 ASSESSMENT — PAIN DESCRIPTION - PROGRESSION: CLINICAL_PROGRESSION: GRADUALLY IMPROVING

## 2020-11-13 NOTE — PROGRESS NOTES
Physical Therapy  Daily Treatment Note  Date: 2020  Patient Name: Aisha Hernandez  MRN: [id number]     :   1945    Subjective:   General  Referring Practitioner: Rodney Ireland  PT Visit Information  Onset Date: 20  PT Insurance Information: Medicare  Total # of Visits Approved: 18  Total # of Visits to Date: 13  No Show: 0  Canceled Appointment: 0  Subjective  Subjective: Patient reports no pain just tenderness on medial knee. States knee felt tighter yesterday, slightly swollen. Pain Screening  Patient Currently in Pain: Denies  Pain Assessment  Clinical Progression: Gradually improving  Vital Signs  Patient Currently in Pain: Denies       Treatment Activities:    AROM RLE (degrees)  R Knee Flexion 0-145: R knee ext/ flex: -10/ 90  ( NOW - )   Exercises  Exercise 1: Nu Step L3 8 min  Exercise 2: Quad Set 10x 5\" hold, last set with overpressure  Exercise 3: Passive stretches in sitting, 10\"x 10; supine  Exercise 5: slant board stretch 3x30\"  Exercise 6: Cable walking, 4 ways, 7#, 3x@  Exercise 7: Fwd lunge on 6\" step, 15\"x3; hams stretch on 6\" step, 15\"x3  Exercise 10: gentle patellar mobs R ; jt. mob - A/P glides  Exercise 11: Step Ups F/L (to R) 6\" X15 EA  Exercise 12: Total gym, partial squats, 10x2  Exercise 13: T BAND R TKE 30X -LIME  Exercise 14: Step Downs 15x 6\" step - up with R, down with L   Assessment:   Conditions Requiring Skilled Therapeutic Intervention  Body structures, Functions, Activity limitations: Decreased ADL status; Decreased functional mobility ; Decreased ROM; Decreased strength;Decreased endurance  Assessment: Came in wearing a regular shoe now on her L foot. Noted 5 deg increase in extension, 8 deg flexion. Reporting now able to do more with her daily chores including laundry, going up/ down the stairs more.   Treatment Diagnosis: difficulty walking R26.2  Prognosis: Good  REQUIRES PT FOLLOW UP: Yes         Goals:  Short term goals  Time Frame for Short term goals: 7 visits  Short term goal 1: 0-90 R knee ROM supine - ONGOING  Short term goal 2: Decrease R ankle edema by 2 cm - MET ( NOW 26 CM )  Short term goal 3: Ambulate 100 ft with str cane - MET  Short term goal 4:  Indep HEP - (MET)  Long term goals  Time Frame for Long term goals : 18 visits  Long term goal 1: Full R knee AROM - ONGOING  Long term goal 2: MMT R KNEE 4+/5 - ONGOING  Long term goal 3: LEFS score < 10 % limitation to return to full previous activity level - PARTLY MET ( NOW 40 = 50% DISABILITY )  Patient Goals   Patient goals : Full ROM R knee, no pain  Plan:    Plan  Times per week: 2 -3 x/wk for 18 visits  Current Treatment Recommendations: Strengthening, Home Exercise Program, ROM, Patient/Caregiver Education & Training, Manual Therapy - Joint Manipulation, Modalities  Plan Comment: Cont per POC, emphasize on improving ROM  Timed Code Treatment Minutes: 40 Minutes     Therapy Time   Individual Concurrent Group Co-treatment   Time In 0915         Time Out 1005         Minutes 50         Timed Code Treatment Minutes: 40 Minutes     Treatment Charges: Minutes Units   []  Ultrasound     []  Electrical-Stim     []  Iontophoresis     []  Traction     []  Massage       []  Eval     []  Gait     [x]  Ther Exercise 30  2    [x]  Manual Therapy 10   1   []  Ther Activities       []  Aquatics     []  Vasopneumatic Device     []  Neuro Re-Ed       []  Other       Total Treatment Time: 36 3        Ma  Lida Bryan PT Electronically signed by Bismark Fischer PT on 11/13/2020 at 10:08 AM

## 2020-11-16 ENCOUNTER — HOSPITAL ENCOUNTER (OUTPATIENT)
Dept: PHYSICAL THERAPY | Age: 75
Setting detail: THERAPIES SERIES
Discharge: HOME | End: 2020-11-16
Payer: MEDICARE

## 2020-11-16 PROCEDURE — 97140 MANUAL THERAPY 1/> REGIONS: CPT

## 2020-11-16 PROCEDURE — 97110 THERAPEUTIC EXERCISES: CPT

## 2020-11-16 ASSESSMENT — PAIN DESCRIPTION - PROGRESSION: CLINICAL_PROGRESSION: GRADUALLY IMPROVING

## 2020-11-16 NOTE — PROGRESS NOTES
visits  Short term goal 1: 0-90 R knee ROM supine - ONGOING  Short term goal 2: Decrease R ankle edema by 2 cm - MET ( NOW 26 CM )  Short term goal 3: Ambulate 100 ft with str cane - MET  Short term goal 4: Indep HEP - (MET)  Long term goals  Time Frame for Long term goals : 18 visits  Long term goal 1: Full R knee AROM - ONGOING  Long term goal 2: MMT R KNEE 4+/5 - ONGOING  Long term goal 3: LEFS score < 10 % limitation to return to full previous activity level - PARTLY MET ( NOW 40 = 50% DISABILITY )  Patient Goals   Patient goals : Full ROM R knee, no pain  Plan:    Plan  Times per week: 2 -3 x/wk for 18 visits  Current Treatment Recommendations: Strengthening, Home Exercise Program, ROM, Patient/Caregiver Education & Training, Manual Therapy - Joint Manipulation, Modalities  Plan Comment: Cont per POC, emphasize on improving ROM  Timed Code Treatment Minutes: 40 Minutes      Therapy Time    Individual Concurrent Group Co-treatment   Time In 1105      Time Out 1155      Minutes 50      Timed Code Treatment Minutes: 40 Minutes  Treatment Charges: Minutes Units   []?  Ultrasound       []?  Electrical-Stim       []?  Iontophoresis       []?  Traction       []?  Massage       []?  Eval       []?  Gait       [x]?   Ther Exercise 30  2    [x]?   Manual Therapy 10   1   []?  Ther Activities       []?  Aquatics       []?  Vasopneumatic Device       []?  Neuro Re-Ed       []?  Other       Total Treatment Time: 40 3       Glena Europe, PTA

## 2020-11-18 ENCOUNTER — HOSPITAL ENCOUNTER (OUTPATIENT)
Dept: PHYSICAL THERAPY | Age: 75
Setting detail: THERAPIES SERIES
Discharge: HOME | End: 2020-11-18
Payer: MEDICARE

## 2020-11-18 PROCEDURE — 97140 MANUAL THERAPY 1/> REGIONS: CPT

## 2020-11-18 PROCEDURE — 97110 THERAPEUTIC EXERCISES: CPT

## 2020-11-18 ASSESSMENT — PAIN DESCRIPTION - PROGRESSION: CLINICAL_PROGRESSION: GRADUALLY IMPROVING

## 2020-11-18 NOTE — PROGRESS NOTES
Physical Therapy  Progress Note  Date: 2020  Patient Name: Tha Marroquin  MRN: [id number]     :   1945    Subjective:   General  Chart Reviewed: Yes  Referring Practitioner: Jim Gonsalez  PT Visit Information  Onset Date: 20  PT Insurance Information: Medicare  Total # of Visits Approved: 18  Total # of Visits to Date: 15  No Show: 0  Canceled Appointment: 0  Subjective  Subjective: Patient feels therapy is helping but feels she needs more to improve her mobility and be able to go down the steps and walk longer without pain. Pain Screening  Patient Currently in Pain: Denies  Pain Assessment  Pain Assessment: 0-10(can go up to 5/10 with prolonged walking)  Clinical Progression: Gradually improving  Vital Signs  Patient Currently in Pain: Denies       Treatment Activities:    Gait Deviations  Gait Deviations: Decreased step height  Ambulation 1  Surface: level tile  Device: No Device  Assistance: Independent  Comments: TUG 21 sec with RW, dec stance time R, short step L  ( NOW 12 sec, no device )   AROM RLE (degrees)  R Knee Flexion 0-145: R knee ext/ flex: -10/ 90  ( NOW - )  Strength RLE  Strength RLE: WFL  Strength LLE  Strength LLE: WFL    Exercises  Exercise 1: Nu Step L3 8 min  Exercise 2: Quad Set 10x 5\" hold, last set with overpressure  Exercise 3: Passive stretches in sitting, 10\"x 10; supine  Exercise 5: slant board stretch 3x30\"  Exercise 6: Cable walking, 4 ways, 7#, 3x@  Exercise 7: Fwd lunge on 6\" step, 15\"x3; hams stretch on 6\" step, 15\"x3  Exercise 10: gentle patellar mobs R ; jt. mob - A/P glides  Exercise 11: Step Ups F/L (to R) 6\" X15 EA  Exercise 12: Total gym, partial squats, 10x2  Exercise 13: T BAND R TKE 30X -LIME  Exercise 14: Step Downs 15x 6\" step - up with R, down with L   Assessment:   Conditions Requiring Skilled Therapeutic Intervention  Body structures, Functions, Activity limitations: Decreased ADL status; Decreased functional mobility ; Decreased ROM; Decreased strength;Decreased endurance; Increased pain  Assessment: Patient reports increased pain to 5/10 with prolonged walking, walked about 1 1/2 hr. in the store and had increased pain. Continues with difficulty going down the step, better in going up the step. Meeting 3/4 STG, 1/3 LTG but making gains. Will benefit from continued therapy to further improve ROM. Treatment Diagnosis: difficulty walking R26.2  Prognosis: Good  REQUIRES PT FOLLOW UP: Yes    OutComes Score  LEFS Total Score: 48 (11/18/20 1115)               Goals:  Short term goals  Time Frame for Short term goals: 7 visits  Short term goal 1: 0-90 R knee ROM supine - PARTLY MET ( NOW -5/98 )  Short term goal 2: Decrease R ankle edema by 2 cm - MET ( NOW 26 CM )  Short term goal 3: Ambulate 100 ft with str cane - MET  Short term goal 4: Indep HEP - (MET)  Long term goals  Time Frame for Long term goals : 18 visits  Long term goal 1: Full R knee AROM - ONGOING  Long term goal 2: MMT R KNEE 4+/5 - MET  Long term goal 3: LEFS score < 10 % limitation to return to full previous activity level - PARTLY MET ( NOW 48 = 40% DISABILITY )  Patient Goals   Patient goals : Full ROM R knee, no pain  Plan:    Plan  Times per week: 2 -3 x/wk for 18 visits  Current Treatment Recommendations: Strengthening, Home Exercise Program, ROM, Patient/Caregiver Education & Training, Manual Therapy - Joint Manipulation, Modalities  Plan Comment: Cont per POC, emphasize on improving ROM. Has 3 remaining visits, recommend further therapy for another 8 visits to meet goals. Please advise. Thanks.   Timed Code Treatment Minutes: 39 Minutes     Therapy Time   Individual Concurrent Group Co-treatment   Time In 1115         Time Out 1210         Minutes 55         Timed Code Treatment Minutes: 45 Minutes     Treatment Charges: Minutes Units   []  Ultrasound     []  Electrical-Stim     []  Iontophoresis     []  Traction     []  Massage       []  Eval     []  Gait     [x]  Ther Exercise 30   2 [x]  Manual Therapy 15   1   []  Ther Activities       []  Aquatics     []  Vasopneumatic Device     []  Neuro Re-Ed       []  Other       Total Treatment Time: 39 [address] [facility] Pushpa Ladd, PT Electronically signed by Pascale York PT on 11/18/2020 at 12:16 PM         Physician Signature:________________________________Date:__________________  By signing above, I have reviewed this plan of care and certify a need for medically   necessary rehabilitation services

## 2020-11-20 ENCOUNTER — HOSPITAL ENCOUNTER (OUTPATIENT)
Dept: PHYSICAL THERAPY | Age: 75
Setting detail: THERAPIES SERIES
Discharge: HOME | End: 2020-11-20
Payer: MEDICARE

## 2020-11-20 PROCEDURE — 97140 MANUAL THERAPY 1/> REGIONS: CPT

## 2020-11-20 PROCEDURE — 97110 THERAPEUTIC EXERCISES: CPT

## 2020-11-20 ASSESSMENT — PAIN DESCRIPTION - PROGRESSION: CLINICAL_PROGRESSION: GRADUALLY IMPROVING

## 2020-11-20 NOTE — PROGRESS NOTES
[address] [facility]   Outpatient Physical Therapy  [address] [place] [address]. [address]  Phone: [phone]  Fax: [phone]    Daily Progress Note    Date: 20    Patient Name: Socorro Gonzalez        MRN: [id number]  Account: [de-identified] : 1945      General Information:  Chart Reviewed: Yes  Referring Practitioner: Christina Charles  Referral Date : 10/07/20  Diagnosis: R knee OA s/p TKR 20  Follows Commands: Within Functional Limits  Other (Comment): 's appt. 2020  Onset Date: 20  PT Insurance Information: Medicare  Total # of Visits Approved: 26(ok for 8 visits)  Total # of Visits to Date: 16  No Show: 0  Canceled Appointment: 0    Subjective:  Subjective: Patient reports has been trying to stretch at home, occasionally asking her  to push for more stretch     Pain:  Patient Currently in Pain: Denies  Clinical Progression: Gradually improving       Objective:  Exercise 1: Nu Step L4 8 min  Exercise 2: Quad Set 10x 5\" hold, last set with overpressure  Exercise 3: Passive stretches in sitting, 15\"x 5; supine with hip at 90 flex, 15\"x5  Exercise 5: slant board stretch 3x30\"  Exercise 6: Cable walking, 4 ways, 10#, 3x@  Exercise 7: Fwd lunge on 6\" step, 15\"x3; hams stretch on 6\" step, 15\"x3  Exercise 10: gentle patellar mobs R ; jt. mob - A/P glides  Exercise 11: Step Ups F/L (to R) 6\" X10x2 EA  Exercise 12: Total gym, partial squats, 10x3  Exercise 13: T BAND R TKE 30X -LIME  Exercise 14: Step Downs 10x2 6\" step - up with R, down with L     Assessment: Body structures, Functions, Activity limitations: Decreased ADL status; Decreased functional mobility ; Decreased ROM; Decreased strength; Increased pain;Decreased endurance  Assessment: Tolerated progression of exercises with increased reps, increased weight on cable walkout.   Treatment Diagnosis: difficulty walking R26.2  Prognosis: Good  REQUIRES PT FOLLOW UP: Yes      Goals:  Patient Goals:  Patient goals : Full ROM R knee, no pain  Short Term Goals:  Time Frame for Short term goals: 7 visits  Short term goal 1: 0-90 R knee ROM supine - PARTLY MET ( NOW -5/98 )  Short term goal 2: Decrease R ankle edema by 2 cm - MET ( NOW 26 CM )  Short term goal 3: Ambulate 100 ft with str cane - MET  Short term goal 4: Indep HEP - (MET)  Long Term Goals:  Time Frame for Long term goals : 18 visits  Long term goal 1: Full R knee AROM - ONGOING  Long term goal 2: MMT R KNEE 4+/5 - MET  Long term goal 3: LEFS score < 10 % limitation to return to full previous activity level - PARTLY MET ( NOW 48 = 40% DISABILITY )    Plan:     Times per week: 2 -3 x/wk for 18 visits, ok for 8 more visits  Current Treatment Recommendations: Strengthening;Home Exercise Program;ROM;Patient/Caregiver Education & Training;Manual Therapy - Joint Manipulation;Modalities  Plan Comment: Cont per POC, emphasize on improving ROM.     Therapy Time:  Time In: 1050  Time Out: 0990  Minutes: 55  Timed Code Treatment Minutes: 45 Minutes    Treatment Charges: Minutes Units   []  Ultrasound     []  Electrical-Stim     []  Iontophoresis     []  Traction     []  Massage       []  Eval     []  Gait     [x]  Ther Exercise 35  2    [x]  Manual Therapy 10  1    []  Ther Activities       []  Aquatics     []  Neuro Re-Ed       []  Other       Total Treatment Time: 39 [address] [facility] Pushpa Ladd, PT Electronically signed by Aydee Morales PT on 11/20/2020 at 12:15 PM

## 2020-11-23 ENCOUNTER — HOSPITAL ENCOUNTER (OUTPATIENT)
Dept: PHYSICAL THERAPY | Age: 75
Setting detail: THERAPIES SERIES
Discharge: HOME | End: 2020-11-23
Payer: MEDICARE

## 2020-11-23 PROCEDURE — 97110 THERAPEUTIC EXERCISES: CPT

## 2020-11-23 ASSESSMENT — PAIN SCALES - GENERAL: PAINLEVEL_OUTOF10: 1

## 2020-11-23 ASSESSMENT — PAIN DESCRIPTION - LOCATION: LOCATION: KNEE

## 2020-11-23 ASSESSMENT — PAIN DESCRIPTION - ORIENTATION: ORIENTATION: RIGHT

## 2020-11-23 NOTE — PROGRESS NOTES
[address]   Outpatient Physical Therapy  [address] [facility]. [address]  Phone: [phone]  Fax: [phone]  Daily Progress Note    Date: 20    Patient Name: Teretha Blizzard        MRN: [id number]  Account: [de-identified] : 1945      General Information:  Referring Practitioner: Balta Kaur  Referral Date : 10/07/20  Diagnosis: R knee OA s/p TKR 20  Other (Comment): 's appt. 2020  Onset Date: 20  PT Insurance Information: Medicare  Total # of Visits Approved: 26(ok for 8 visits)  Total # of Visits to Date: 17  No Show: 0  Canceled Appointment: 0    Subjective:  Subjective: States most difficulty with descending steps; improving climbing up. Reports pain is very minimal     Pain:  Patient Currently in Pain: Yes  Pain Assessment: 0-10  Pain Level: 1  Pain Location: Knee  Pain Orientation: Right     Objective:  Exercise 1: Nu Step L4 8 min  Exercise 5: slant board stretch 3x30\"  Exercise 6: Cable walking, 4 ways, 10#, 5x@  Exercise 7: Fwd lunge on 10\" step, 15\"x3; hams stretch on 10\" step, 15\"x3  Exercise 10: Inferior Patellar Mobs and Posterior Glides to   Exercise 11: Step Ups F/L (to R) 6\" X10x2 EA  Exercise 12: Total gym with pillow at head, partial squats, 10x3  Exercise 13: R TKE Freemotion 10# 15x  Exercise 14: Step Downs 10x2 6\" step - up with R, down with L     Assessment: Body structures, Functions, Activity limitations: Decreased ADL status; Decreased functional mobility ; Decreased ROM; Decreased strength; Increased pain;Decreased endurance  Treatment Diagnosis: difficulty walking R26.2  Prognosis: Good  REQUIRES PT FOLLOW UP: Yes  Activity Tolerance: Patient Tolerated treatment well  Comments: Fatigues easily but offers good effort.  Obtained Supine AAROM of R knee 99*; improving with knee ext    Plan:  Plan: Continue with current plan    Therapy Time:  Time In: 1015  Time Out: 1110  Minutes: 55  Timed Code Treatment Minutes: 45 Minutes    Treatment Charges: Minutes Units   []  Ultrasound     []  Electrical-Stim     []  Iontophoresis     []  Traction     []  Massage       []  Eval     []  Gait     []  Vasopneumatic Device     [x]  Ther Exercise 45 3   []  Manual Therapy       []  Ther Activities       []  Aquatics     []  Neuro Re-Ed       []  Other       Total Treatment Time: 47 [address] [place], [place]

## 2020-11-30 ENCOUNTER — APPOINTMENT (OUTPATIENT)
Dept: PHYSICAL THERAPY | Age: 75
End: 2020-11-30
Payer: MEDICARE

## 2020-12-01 ENCOUNTER — HOSPITAL ENCOUNTER (OUTPATIENT)
Dept: PHYSICAL THERAPY | Age: 75
Setting detail: THERAPIES SERIES
Discharge: HOME | End: 2020-12-01
Payer: MEDICARE

## 2020-12-01 PROCEDURE — 97140 MANUAL THERAPY 1/> REGIONS: CPT

## 2020-12-01 PROCEDURE — 97110 THERAPEUTIC EXERCISES: CPT

## 2020-12-01 ASSESSMENT — PAIN DESCRIPTION - PAIN TYPE: TYPE: SURGICAL PAIN

## 2020-12-01 ASSESSMENT — PAIN SCALES - GENERAL: PAINLEVEL_OUTOF10: 2

## 2020-12-01 ASSESSMENT — PAIN DESCRIPTION - LOCATION: LOCATION: KNEE

## 2020-12-01 ASSESSMENT — PAIN DESCRIPTION - ORIENTATION: ORIENTATION: RIGHT

## 2020-12-01 ASSESSMENT — PAIN DESCRIPTION - PROGRESSION: CLINICAL_PROGRESSION: GRADUALLY IMPROVING

## 2020-12-01 ASSESSMENT — PAIN DESCRIPTION - DESCRIPTORS: DESCRIPTORS: ACHING;SORE

## 2020-12-01 NOTE — PROGRESS NOTES
[address] [facility]   Outpatient Physical Therapy  [address] [place] [address]. [address]  Phone: [phone]  Fax: [phone]    Daily Progress Note    Date: 20    Patient Name: Pooja Triana        MRN: [id number]  Account: [de-identified] : 1945      General Information:  Chart Reviewed: Yes  Referring Practitioner: Michelle Wade  Referral Date : 10/07/20  Diagnosis: R knee OA s/p TKR 20  Follows Commands: Within Functional Limits  Other (Comment): 's appt. 2020  Onset Date: 20  PT Insurance Information: Medicare  Total # of Visits Approved: 26  Total # of Visits to Date: 18  No Show: 0  Canceled Appointment: 0    Subjective:  Subjective: Patient reports had part of nail on R big toe removed last week, able to do just light exercises for her knee after that. Complains of R ankle being swollen and some increased pain on R knee today. Pain:  Patient Currently in Pain: Yes  Pain Assessment: 0-10  Pain Level: 2  Pain Type: Surgical pain  Pain Location: Knee  Pain Orientation: Right  Pain Descriptors: Aching; Sore  Clinical Progression: Gradually improving       Objective:  Exercise 1: Nu Step L4 8 min  Exercise 2: Quad Set 10x 5\" hold, last set with overpressure  Exercise 3: Passive stretches in sitting, 15\"x 5; supine with hip at 90 flex, 15\"x5  Exercise 5: slant board stretch 3x30\"  Exercise 6: Cable walking, 4 ways, 10#, 5x@  Exercise 7: Fwd lunge on 6 step, 15\"x3; hams stretch on 6\" step, 15\"x3  Exercise 11: Step Ups F/L (to R) 6\" X10x2 EA  Exercise 12: Total gym with pillow at head, partial squats, 10x3  Exercise 13: R TKE Freemotion 10# 15x  Exercise 14: Step Downs 10x2 6\" step - up with R, down with L     Assessment: Body structures, Functions, Activity limitations: Decreased functional mobility ; Decreased ADL status; Decreased ROM; Decreased strength;Decreased endurance; Increased pain  Assessment: Noted increased antalgia this afternoon, decreased stance on R, reports being tender on anterior knee, patellar region  Treatment Diagnosis: difficulty walking R26.2  Prognosis: Good  REQUIRES PT FOLLOW UP: Yes     Goals:  Patient Goals:  Patient goals : Full ROM R knee, no pain  Short Term Goals:  Time Frame for Short term goals: 7 visits  Short term goal 1: 0-90 R knee ROM supine - PARTLY MET ( NOW -5/98 )  Short term goal 2: Decrease R ankle edema by 2 cm - MET ( NOW 26 CM )  Short term goal 3: Ambulate 100 ft with str cane - MET  Short term goal 4: Indep HEP - (MET)  Long Term Goals:  Time Frame for Long term goals : 18 visits  Long term goal 1: Full R knee AROM - ONGOING  Long term goal 2: MMT R KNEE 4+/5 - MET  Long term goal 3: LEFS score < 10 % limitation to return to full previous activity level - PARTLY MET ( NOW 48 = 40% DISABILITY )    Plan:     Times per week: 2 -3 x/wk for 18 visits, ok for 8 more visits  Current Treatment Recommendations: Strengthening;Home Exercise Program;ROM;Patient/Caregiver Education & Training;Manual Therapy - Joint Manipulation;Modalities  Plan Comment: Cont per POC, emphasize on improving ROM.     Therapy Time:  Time In: 9665  Time Out: 1345  Minutes: 60  Timed Code Treatment Minutes: 50 Minutes    Treatment Charges: Minutes Units   []  Ultrasound     []  Electrical-Stim     []  Iontophoresis     []  Traction     []  Massage       []  Eval     []  Gait     [x]  Ther Exercise 40  2    [x]  Manual Therapy 10  1    []  Ther Activities       []  Aquatics     []  Neuro Re-Ed       []  Other       Total Treatment Time: 48 3        Ma  Lillain Hoyt PT Electronically signed by Jerry Hummel PT on 12/1/2020 at 1:51 PM

## 2020-12-02 ENCOUNTER — OFFICE VISIT (OUTPATIENT)
Dept: ORTHOPEDIC SURGERY | Age: 75
End: 2020-12-02

## 2020-12-02 PROCEDURE — 99024 POSTOP FOLLOW-UP VISIT: CPT | Performed by: ORTHOPAEDIC SURGERY

## 2020-12-02 NOTE — PROGRESS NOTES
Fernando Carrillo M.D.            [address] [facility],[address], [address] [facility]           Dept Phone: [phone]           Dept Fax:  [phone], [place]          Dept Phone: [phone]           Dept Fax:  [phone]      Chief Compliant:  Chief Complaint   Patient presents with    Post-Op Check     Rt TKA 9/15/20        History of Present Illness: This is a 66 y.o. female who presents to the clinic today for evaluation / follow up of right total knee on 9/15/2020. She states that overall starting to feel much better. She is making progress with her physical therapy. Little bit of achiness at nighttime but nothing out of the ordinary. Review of Systems   Constitutional: Negative for fever, chills, sweats. Eyes: Negative for changes in vision, or pain. HENT: Negative for ear ache, epistaxis, or sore throat. Respiratory/Cardio: Negative for Chest pain, palpitations, SOB, or cough. Gastrointestinal: Negative for abdominal pain, N/V/D. Genitourinary: Negative for dysuria, frequency, urgency, or hematuria. Neurological: Negative for headache, numbness, or weakness. Integumentary: Negative for rash, itching, laceration, or abrasion. Musculoskeletal: Positive for Post-Op Check (Rt TKA 9/15/20)       Physical Exam:  Constitutional: Patient is oriented to person, place, and time. Patient appears well-developed and well nourished. HENT: Negative otherwise noted  Head: Normocephalic and Atraumatic  Nose: Normal  Eyes: Conjunctivae and EOM are normal  Neck: Normal range of motion Neck supple. Respiratory/Cardio: Effort normal. No respiratory distress. Musculoskeletal: Lamination of her right knee notes her incisions pristine. She has full extension she gets back to at least 115 degrees if not further.   She has good patellar tracking and good stability. Is a slight amount of pretibial edema which is somewhat tender for her but otherwise within normal limits. Neurological: Patient is alert and oriented to person, place, and time. Normal strenght. No sensory deficit. Skin: Skin is warm and dry  Psychiatric: Behavior is normal. Thought content normal.  Nursing note and vitals reviewed. Labs and Imaging:     XR taken today:  No results found. No orders of the defined types were placed in this encounter. Assessment and Plan:  1. Status post total right knee replacement            This is a 66 y.o. female who presents to the clinic today for evaluation / follow up of status post right total knee 9/15/2020. Past History:    Current Outpatient Medications:     escitalopram (LEXAPRO) 5 MG tablet, Take 5 mg by mouth daily, Disp: , Rfl:     aspirin EC 81 MG EC tablet, Take 1 tablet by mouth 2 times daily, Disp: 60 tablet, Rfl: 1    amoxicillin (AMOXIL) 500 MG capsule, 2 capsules one hour prior to dental or procedure flakito't then one cap twice daily until gone, Disp: 6 capsule, Rfl: 3    simvastatin (ZOCOR) 40 MG tablet, Take 40 mg by mouth nightly., Disp: , Rfl:     alendronate (FOSAMAX) 70 MG tablet, Take 70 mg by mouth every 7 days. , Disp: , Rfl:   No Known Allergies  Social History     Socioeconomic History    Marital status:      Spouse name: Not on file    Number of children: Not on file    Years of education: Not on file    Highest education level: Not on file   Occupational History    Not on file   Social Needs    Financial resource strain: Not on file    Food insecurity     Worry: Not on file     Inability: Not on file    Transportation needs     Medical: Not on file     Non-medical: Not on file   Tobacco Use    Smoking status: Never Smoker    Smokeless tobacco: Never Used   Substance and Sexual Activity    Alcohol use: Yes     Comment: Social    Drug use: No    Sexual activity: Not on file   Lifestyle    Physical activity     Days per week: Not on file     Minutes per session: Not on file    Stress: Not on file   Relationships    Social connections     Talks on phone: Not on file     Gets together: Not on file     Attends [employer] service: Not on file     Active member of club or organization: Not on file     Attends meetings of clubs or organizations: Not on file     Relationship status: Not on file    Intimate partner violence     Fear of current or ex partner: Not on file     Emotionally abused: Not on file     Physically abused: Not on file     Forced sexual activity: Not on file   Other Topics Concern    Not on file   Social History Narrative    Not on file     Past Medical History:   Diagnosis Date    Arthritis     Hyperlipidemia     Wears glasses      Past Surgical History:   Procedure Laterality Date    COLONOSCOPY      PRE-MALIGNANT / BENIGN SKIN LESION EXCISION Right 11/16/2015    Rt cheek cyst with local flap closure    TOTAL KNEE ARTHROPLASTY Left 2015    TOTAL KNEE ARTHROPLASTY Right 9/15/2020    KNEE TOTAL ARTHROPLASTY WITH GPS performed by Regina Huber MD at [facility] History   Problem Relation Age of Onset    Heart Attack Mother    Plan  Patient encouraged to continue exercise at home. Overall she is very pleased results. We will see her back here in 3 months or call this problems prior to that time      Provider Attestation:  Beatrice Palumbo, personally performed the services described in this documentation. All medical record entries made by the scribe were at my direction and in my presence. I have reviewed the chart and discharge instructions and agree that the records reflect my personal performance and is accurate and complete. Regina Huber MD. 12/02/20      Please note that this chart was generated using voice recognition Dragon dictation software.   Although every effort was made to ensure the accuracy of this automated transcription, some errors in transcription may have occurred.

## 2020-12-04 ENCOUNTER — HOSPITAL ENCOUNTER (OUTPATIENT)
Dept: PHYSICAL THERAPY | Age: 75
Setting detail: THERAPIES SERIES
Discharge: HOME | End: 2020-12-04
Payer: MEDICARE

## 2020-12-04 PROCEDURE — 97140 MANUAL THERAPY 1/> REGIONS: CPT

## 2020-12-04 PROCEDURE — 97110 THERAPEUTIC EXERCISES: CPT

## 2020-12-04 ASSESSMENT — PAIN DESCRIPTION - PROGRESSION: CLINICAL_PROGRESSION: GRADUALLY IMPROVING

## 2020-12-04 NOTE — PROGRESS NOTES
)  Short term goal 2: Decrease R ankle edema by 2 cm - MET ( NOW 26 CM )  Short term goal 3: Ambulate 100 ft with str cane - MET  Short term goal 4:  Indep HEP - (MET)  Long Term Goals:  Time Frame for Long term goals : 18 visits  Long term goal 1: Full R knee AROM - ONGOING  Long term goal 2: MMT R KNEE 4+/5 - MET  Long term goal 3: LEFS score < 10 % limitation to return to full previous activity level - PARTLY MET ( NOW 48 = 40% DISABILITY )    Plan:     Times per week: 2 -3 x/wk for 18 visits, ok for 8 more visits  Current Treatment Recommendations: Strengthening;Home Exercise Program;ROM;Patient/Caregiver Education & Training;Manual Therapy - Joint Manipulation;Modalities  Plan Comment: Cont per POC progress as tolerated    Therapy Time:  Time In: 0053  Time Out: 1150  Minutes: 65  Timed Code Treatment Minutes: 50 Minutes    Treatment Charges: Minutes Units   []  Ultrasound     []  Electrical-Stim     []  Iontophoresis     []  Traction     []  Massage       []  Eval     []  Gait     [x]  Ther Exercise  35 2    [x]  Manual Therapy 15  1    []  Ther Activities       []  Aquatics     []  Neuro Re-Ed       []  Other       Total Treatment Time: 48 3        Abeba Anderson, PT Electronically signed by Aisha Reddy PT on 12/4/2020 at 12:02 PM

## 2020-12-07 ENCOUNTER — HOSPITAL ENCOUNTER (OUTPATIENT)
Dept: PHYSICAL THERAPY | Age: 75
Setting detail: THERAPIES SERIES
Discharge: HOME | End: 2020-12-07
Payer: MEDICARE

## 2020-12-07 PROCEDURE — 97110 THERAPEUTIC EXERCISES: CPT

## 2020-12-07 NOTE — PROGRESS NOTES
[address]   Outpatient Physical Therapy  [address] [facility]. [address]  Phone: [phone]  Fax: [phone]  Daily Progress Note    Date: 20    Patient Name: Socorro Gonzalez        MRN: [id number]  Account: [de-identified] : 1945      General Information:  Referring Practitioner: Christina Charles  Referral Date : 10/07/20  Diagnosis: R knee OA s/p TKR 20  Follows Commands: Within Functional Limits  Onset Date: 20  PT Insurance Information: Medicare  Total # of Visits Approved: 26  Total # of Visits to Date:   No Show: 0  Canceled Appointment: 0    Subjective:  Subjective: Denies issues this date     Pain:  Patient Currently in Pain: Denies    Objective:  Exercise 1: Nu Step L4 5 min  Exercise 2: Quad Set 10x 5\" hold, last set with overpressure  Exercise 3: Passive stretches in sitting, 15\"x 5; supine with hip at 90 flex, 15\"x5  Exercise 5: slant board stretch 3x30\"  Exercise 6: Cable walking, 4 ways, 10#, 5x@  Exercise 7: Fwd lunge on 8\" step, 15\"x3; hams stretch on 6\" step, 15\"x3  Exercise 11: Step Ups F/L (to R) 8\" X10x1 EA  Exercise 12: Total gym with pillow at head, partial squats, 10x3  Exercise 13: R TKE Freemotion 13# 10x3  Exercise 14: Step Downs 10x2 6\" step - up with R, down with L  Exercise 15: Knee ext, SAQ, 20#, 10x2;  Hams curls, 20#, 10x2    Assessment: Body structures, Functions, Activity limitations: Decreased functional mobility ; Decreased ADL status; Decreased ROM; Decreased strength; Increased pain  Treatment Diagnosis: difficulty walking R26.2  Prognosis: Good  REQUIRES PT FOLLOW UP: Yes  Activity Tolerance: Patient Tolerated treatment well  Comments: Progressed height with step ups without issue.  Emphasis on technique throughout program. R knee mobility improving each visit     Plan:  Plan: Continue with current plan    Therapy Time:  Time In: 1058  Time Out: 4981  Minutes: 47       Treatment Charges: Minutes Units   []  Ultrasound     []  Electrical-Stim     []

## 2020-12-11 ENCOUNTER — HOSPITAL ENCOUNTER (OUTPATIENT)
Dept: PHYSICAL THERAPY | Age: 75
Setting detail: THERAPIES SERIES
Discharge: HOME | End: 2020-12-11
Payer: MEDICARE

## 2020-12-11 PROCEDURE — 97140 MANUAL THERAPY 1/> REGIONS: CPT

## 2020-12-11 PROCEDURE — 97110 THERAPEUTIC EXERCISES: CPT

## 2020-12-11 ASSESSMENT — PAIN DESCRIPTION - PAIN TYPE: TYPE: SURGICAL PAIN

## 2020-12-11 ASSESSMENT — PAIN DESCRIPTION - PROGRESSION: CLINICAL_PROGRESSION: GRADUALLY IMPROVING

## 2020-12-11 ASSESSMENT — PAIN SCALES - GENERAL: PAINLEVEL_OUTOF10: 2

## 2020-12-11 ASSESSMENT — PAIN DESCRIPTION - ORIENTATION: ORIENTATION: RIGHT

## 2020-12-11 ASSESSMENT — PAIN DESCRIPTION - LOCATION: LOCATION: KNEE

## 2020-12-11 NOTE — PROGRESS NOTES
[facility]   Outpatient Physical Therapy  [address] [place] [address]. [address]  Phone: [phone]  Fax: [phone]    Daily Progress Note    Date: 20    Patient Name: Sumeet Tracey        MRN: [id number]  Account: [de-identified] : 1945      General Information:  Referring Practitioner: Latrell Li  Referral Date : 10/07/20  Diagnosis: R knee OA s/p TKR 20  Follows Commands: Within Functional Limits  Onset Date: 20  PT Insurance Information: Medicare  Total # of Visits Approved: 26  Total # of Visits to Date:   No Show: 0  Canceled Appointment: 0    Subjective:  Subjective: Patient reports feeling sore and stiff yesterday, \" must have overdone my exercises at home \". Better this morning     Pain:  Patient Currently in Pain: Yes  Pain Assessment: 0-10  Pain Level: 2  Pain Type: Surgical pain  Pain Location: Knee  Pain Orientation: Right  Clinical Progression: Gradually improving       Objective:  Exercise 1: Nu Step L4 8 min  Exercise 2: Quad Set 10x 5\" hold, last set with overpressure  Exercise 3: Passive stretches in sitting, 15\"x 5; supine with hip at 90 flex, 15\"x5  Exercise 5: slant board stretch 3x30\"  Exercise 6: Cable walking, 4 ways, 10#, 5x@  Exercise 7: Fwd lunge on 8\" step,30\"x3; hams stretch on 8\" step, 30\"x3  Exercise 11: Step Ups F/L (to R) 8\" X10x1 EA  Exercise 12: Leg press, 20# 10x3  Exercise 13: R TKE Freemotion 13# 10x3  Exercise 14: Step Downs 10x2 8\" step - up with R, down with L  Exercise 15: Knee ext, SAQ, 20#, 10x2;  Hams curls, 20#, 10x2     Assessment: Body structures, Functions, Activity limitations: Decreased functional mobility ; Decreased ADL status; Decreased ROM; Decreased strength; Increased pain  Assessment: Continues to do well with exercise program, tolerated progression. Very motivated and hard worker.   Treatment Diagnosis: difficulty walking R26.2  Prognosis: Good  REQUIRES PT FOLLOW UP: Yes   Goals:  Patient Goals:  Patient goals : Full ROM R knee, no pain  Short Term Goals:  Time Frame for Short term goals: 7 visits  Short term goal 1: 0-90 R knee ROM supine - PARTLY MET ( NOW -5/98 )  Short term goal 2: Decrease R ankle edema by 2 cm - MET ( NOW 26 CM )  Short term goal 3: Ambulate 100 ft with str cane - MET  Short term goal 4:  Indep HEP - (MET)  Long Term Goals:  Time Frame for Long term goals : 18 visits  Long term goal 1: Full R knee AROM - ONGOING  Long term goal 2: MMT R KNEE 4+/5 - MET  Long term goal 3: LEFS score < 10 % limitation to return to full previous activity level - PARTLY MET ( NOW 48 = 40% DISABILITY )    Plan:     Times per week: 2 -3 x/wk for 18 visits, ok for 8 more visits  Current Treatment Recommendations: Strengthening;Home Exercise Program;ROM;Patient/Caregiver Education & Training;Manual Therapy - Joint Manipulation;Modalities  Plan Comment: Cont per POC progress as tolerated , recheck next week    Therapy Time:  Time In: 1040  Time Out: 121 [facility]  Minutes: 65  Timed Code Treatment Minutes: 50 Minutes    Treatment Charges: Minutes Units   []  Ultrasound     []  Electrical-Stim     []  Iontophoresis     []  Traction     []  Massage       []  Eval     []  Gait     [x]  Ther Exercise 40  2    [x]  Manual Therapy 10  1    []  Ther Activities       []  Aquatics     []  Neuro Re-Ed       []  Other       Total Treatment Time: 48 3        Ma  Lillian Hoyt PT Electronically signed by Jerry Hummel PT on 12/11/2020 at 11:40 AM

## 2020-12-14 ENCOUNTER — HOSPITAL ENCOUNTER (OUTPATIENT)
Dept: PHYSICAL THERAPY | Age: 75
Setting detail: THERAPIES SERIES
Discharge: HOME | End: 2020-12-14
Payer: MEDICARE

## 2020-12-14 PROCEDURE — 97110 THERAPEUTIC EXERCISES: CPT

## 2020-12-14 ASSESSMENT — PAIN DESCRIPTION - PAIN TYPE: TYPE: SURGICAL PAIN

## 2020-12-14 ASSESSMENT — PAIN SCALES - GENERAL: PAINLEVEL_OUTOF10: 2

## 2020-12-14 ASSESSMENT — PAIN DESCRIPTION - ORIENTATION: ORIENTATION: RIGHT

## 2020-12-14 ASSESSMENT — PAIN DESCRIPTION - LOCATION: LOCATION: KNEE

## 2020-12-14 NOTE — PROGRESS NOTES
[address]   Outpatient Physical Therapy  [address] [facility]. [address]  Phone: [phone]  Fax: [phone]  Daily Progress Note    Date: 20    Patient Name: Ector Ahuja        MRN: [id number]  Account: [de-identified] : 1945      General Information:  Referring Practitioner: John Sahu  Referral Date : 10/07/20  Diagnosis: R knee OA s/p TKR 20  Follows Commands: Within Functional Limits  Onset Date: 20  PT Insurance Information: Medicare  Total # of Visits Approved: 26  Total # of Visits to Date:   No Show: 0  Canceled Appointment: 0    Subjective:  Subjective: Notes only stiffness in the AM. Feels she is progressing well with therapy     Pain:  Patient Currently in Pain: Yes  Pain Assessment: 0-10  Pain Level: 2  Pain Type: Surgical pain  Pain Location: Knee  Pain Orientation: Right  Pain Descriptors: (Stiff)       Objective:  Exercise 1: Nu Step L4 8 min  Exercise 2: Quad Set 10x 5\" hold, last set with overpressure  Exercise 3: Passive stretches in sitting, 15\"x 5; supine with hip at 90 flex, 15\"x5  Exercise 5: slant board stretch 3x30\"  Exercise 6: Cable walking, 4 ways, 10#, 5x@  Exercise 7: Fwd lunge on 8\" step,30\"x3; hams stretch on 8\" step, 30\"x3  Exercise 11: Step Ups F/L (to R) 8\" X10x2 EA  Exercise 12: Leg press, 20# 10x3  Exercise 13: R TKE Freemotion 13# 10x3  Exercise 14: Step Downs 10x2 8\" step - up with R, down with L  Exercise 15: Knee ext, SAQ, 20#, 10x3;  Hams curls, 20#, 10x3    Assessment: Body structures, Functions, Activity limitations: Decreased functional mobility ; Decreased ADL status; Decreased ROM; Decreased strength; Increased pain  Treatment Diagnosis: difficulty walking R26.2  Prognosis: Good  REQUIRES PT FOLLOW UP: Yes  Activity Tolerance: Patient Tolerated treatment well Comments: Increased reps to tolerance; verbal cues for technique with step downs to increase R knee flexion and eccentric control Patient obtained AAROM 98* R knee flexion in sitting and supine.  Reviewed importance of home stretching    Plan:  Plan: Continue with current plan    Therapy Time:  Time In: 0954  Time Out: [address]  Minutes: 59  Timed Code Treatment Minutes: 51 Minutes    Treatment Charges: Minutes Units   []  Ultrasound     []  Electrical-Stim     []  Iontophoresis     []  Traction     []  Massage       []  Eval     []  Gait     []  Vasopneumatic Device     [x]  Ther Exercise 51 3   []  Manual Therapy       []  Ther Activities       []  Aquatics     []  Neuro Re-Ed       []  Other       Total Treatment Time: 46 [facility], [facility]

## 2020-12-18 ENCOUNTER — HOSPITAL ENCOUNTER (OUTPATIENT)
Dept: PHYSICAL THERAPY | Age: 75
Setting detail: THERAPIES SERIES
Discharge: HOME | End: 2020-12-18
Payer: MEDICARE

## 2020-12-18 PROCEDURE — 97110 THERAPEUTIC EXERCISES: CPT

## 2020-12-18 PROCEDURE — 97140 MANUAL THERAPY 1/> REGIONS: CPT

## 2020-12-18 ASSESSMENT — PAIN DESCRIPTION - PROGRESSION: CLINICAL_PROGRESSION: GRADUALLY IMPROVING

## 2020-12-18 ASSESSMENT — PAIN SCALES - GENERAL: PAINLEVEL_OUTOF10: 3

## 2020-12-18 ASSESSMENT — PAIN DESCRIPTION - LOCATION: LOCATION: KNEE;LEG

## 2020-12-18 ASSESSMENT — PAIN DESCRIPTION - ORIENTATION: ORIENTATION: RIGHT

## 2020-12-18 ASSESSMENT — PAIN DESCRIPTION - PAIN TYPE: TYPE: SURGICAL PAIN

## 2020-12-18 NOTE — PROGRESS NOTES
Physical Therapy  Progress Note  Date: 2020  Patient Name: Ada Scott  MRN: [id number]     :   1945    Subjective:   General  Chart Reviewed: Yes  Referring Practitioner: Lauren Blevins  PT Visit Information  Onset Date: 20  PT Insurance Information: Medicare  Total # of Visits Approved: 26  Total # of Visits to Date:   No Show: 0  Canceled Appointment: 0  Subjective  Subjective: Patient reports slight increase in pain this morning down the R lower leg. Has been doing her stretches at home. Pain Screening  Patient Currently in Pain: Yes  Pain Assessment  Pain Assessment: 0-10  Pain Level: 3  Pain Type: Surgical pain  Pain Location: Knee;Leg  Pain Orientation: Right  Clinical Progression: Gradually improving  Vital Signs  Patient Currently in Pain: Yes       Treatment Activities:    AROM RLE (degrees)  R Knee Flexion 0-145: R knee ext/ flex: -10/ 90  ( NOW -5 /100)   Exercises  Exercise 1: Nu Step L4 8 min  Exercise 2: Quad Set 10x 5\" hold, last set with overpressure  Exercise 3: Passive stretches in sitting, 15\"x 5; supine with hip at 90 flex, 15\"x5  Exercise 5: slant board stretch 3x30\"  Exercise 6: Cable walking, 4 ways, 10#, 5x@  Exercise 7: Fwd lunge on 8\" step,30\"x3; hams stretch on 8\" step, 30\"x3  Exercise 11: Step Ups F/L (to R) 8\" X10x2 EA  Exercise 12: Leg press, 20# 10x3  Exercise 13: R TKE Freemotion 13# 10x3  Exercise 14: Step Downs 10x2 8\" step - up with R, down with L  Exercise 15: Knee ext, SAQ, 20#, 10x3;  Hams curls, 20#, 10x3      Assessment:   Conditions Requiring Skilled Therapeutic Intervention  Body structures, Functions, Activity limitations: Decreased functional mobility ; Decreased ADL status; Decreased ROM; Decreased strength; Increased pain  Assessment: Improved to 100 deg R knee flexion but still with tight end range, minimal antalgia today. Will benefit from continued skilled therapy to further improve her ROM and be able to go down the steps without difficulty. Treatment Diagnosis: difficulty walking R26.2  Prognosis: Good  REQUIRES PT FOLLOW UP: Yes    Goals:  Short term goals  Time Frame for Short term goals: 7 visits  Short term goal 1: 0-90 R knee ROM supine -  MET ( NOW -5/100 )  Short term goal 2: Decrease R ankle edema by 2 cm - MET ( NOW 26 CM )  Short term goal 3: Ambulate 100 ft with str cane - MET  Short term goal 4:  Indep HEP - (MET)  Long term goals  Time Frame for Long term goals : 18 visits  Long term goal 1: Full R knee AROM - PARTLY MET  Long term goal 2: MMT R KNEE 4+/5 - MET  Long term goal 3: LEFS score < 10 % limitation to return to full previous activity level - PARTLY MET ( NOW 48 = 40% DISABILITY )  Patient Goals   Patient goals : Full ROM R knee, no pain    Plan:    Plan  Times per week: 2 -3 x/wk for 18 visits, ok for 8 more visits  Current Treatment Recommendations: Strengthening, Home Exercise Program, ROM, Patient/Caregiver Education & Training, Manual Therapy - Joint Manipulation, Modalities  Plan Comment: Cont per POC progress as tolerated , scheduled 2 more visits, emphasize on improving ROM  Timed Code Treatment Minutes: 50 Minutes     Therapy Time   Individual Concurrent Group Co-treatment   Time In 1350         Time Out 1450         Minutes 60         Timed Code Treatment Minutes: 50 Minutes     Treatment Charges: Minutes Units   []  Ultrasound     []  Electrical-Stim     []  Iontophoresis     []  Traction     []  Massage       []  Eval     []  Gait     [x]  Ther Exercise  35 2    [x]  Manual Therapy 15  1    []  Ther Activities       []  Aquatics     []  Vasopneumatic Device     []  Neuro Re-Ed       []  Other       Total Treatment Time: 48 3        Ma  Abbott Market, PT Electronically signed by Miquel Kessler PT on 12/18/2020 at 3:05 PM

## 2020-12-22 ENCOUNTER — HOSPITAL ENCOUNTER (OUTPATIENT)
Dept: PHYSICAL THERAPY | Age: 75
Setting detail: THERAPIES SERIES
Discharge: HOME | End: 2020-12-22
Payer: MEDICARE

## 2020-12-22 PROCEDURE — 97140 MANUAL THERAPY 1/> REGIONS: CPT

## 2020-12-22 PROCEDURE — 97110 THERAPEUTIC EXERCISES: CPT

## 2020-12-22 ASSESSMENT — PAIN DESCRIPTION - LOCATION: LOCATION: KNEE;LEG

## 2020-12-22 ASSESSMENT — PAIN DESCRIPTION - PROGRESSION: CLINICAL_PROGRESSION: GRADUALLY IMPROVING

## 2020-12-22 ASSESSMENT — PAIN SCALES - GENERAL: PAINLEVEL_OUTOF10: 1

## 2020-12-22 ASSESSMENT — PAIN DESCRIPTION - ORIENTATION: ORIENTATION: RIGHT

## 2020-12-22 ASSESSMENT — PAIN DESCRIPTION - PAIN TYPE: TYPE: SURGICAL PAIN

## 2020-12-22 ASSESSMENT — PAIN DESCRIPTION - FREQUENCY: FREQUENCY: INTERMITTENT

## 2020-12-22 NOTE — PROGRESS NOTES
Physical Therapy  Daily Treatment Note  Date: 2020  Patient Name: Jeff Browne  MRN: [id number]     :   1945    Subjective:   General  Chart Reviewed: Yes  Additional Pertinent Hx: L TKA , arthritis  Referring Practitioner: Karis Cason  PT Visit Information  Onset Date: 20  PT Insurance Information: Medicare  Total # of Visits Approved: 26  Total # of Visits to Date: 24  No Show: 0  Canceled Appointment: 0  Subjective  Subjective: Pateint reports today with cont stifnness and occasional pain in knee. Noted that stretches help, with minimal lasting effect. Pain Screening  Patient Currently in Pain: Yes  Pain Assessment  Pain Assessment: 0-10  Pain Level: 1  Patient's Stated Pain Goal: No pain  Pain Type: Surgical pain  Pain Location: Knee;Leg  Pain Orientation: Right  Pain Frequency: Intermittent  Clinical Progression: Gradually improving  Vital Signs  Patient Currently in Pain: Yes       Treatment Activities:     Exercises  Exercise 1: Nu Step L4 5 min  Exercise 2: Quad Set 10x 5\" hold, last set with overpressure  Exercise 3: Passive stretches in sitting, 15\"x 5; supine with hip at 90 flex, 15\"x5  Exercise 5: slant board stretch 3x30\"  Exercise 6: Cable walking, 4 ways, 10#, 5x@  Exercise 7: Fwd lunge on 8\" step,30\"x3; hams stretch on 8\" step, 30\"x3  Exercise 11: Step Ups F/L (to R) 8\" X10x2 EA  Exercise 12: Leg press, 20# 10x3  Exercise 13: R TKE Freemotion 13# 10x3  Exercise 14: Step Downs 10x2 8\" step - up with R, down with L  Exercise 15: Knee ext, SAQ, 20#, 10x3;  Hams curls, 20#, 10x3    Assessment:   Conditions Requiring Skilled Therapeutic Intervention  Body structures, Functions, Activity limitations: Decreased functional mobility ; Decreased ADL status; Decreased ROM; Decreased strength; Increased pain  Assessment: Cont exercises per cahrt with focus on improving R LE strength and ROM.   Treatment Diagnosis: difficulty walking R26.2  Prognosis: Good  Decision Making: Low Complexity  REQUIRES PT FOLLOW UP: Yes     Goals:  Short term goals  Time Frame for Short term goals: 7 visits  Short term goal 1: 0-90 R knee ROM supine -  MET ( NOW -5/100 )  Short term goal 2: Decrease R ankle edema by 2 cm - MET ( NOW 26 CM )  Short term goal 3: Ambulate 100 ft with str cane - MET  Short term goal 4:  Indep HEP - (MET)  Long term goals  Time Frame for Long term goals : 18 visits  Long term goal 1: Full R knee AROM - PARTLY MET  Long term goal 2: MMT R KNEE 4+/5 - MET  Long term goal 3: LEFS score < 10 % limitation to return to full previous activity level - PARTLY MET ( NOW 48 = 40% DISABILITY )  Patient Goals   Patient goals : Full ROM R knee, no pain    Plan:    Plan  Times per week: 2 -3 x/wk for 18 visits, ok for 8 more visits  Current Treatment Recommendations: Strengthening, Home Exercise Program, ROM, Patient/Caregiver Education & Training, Manual Therapy - Joint Manipulation, Modalities  Timed Code Treatment Minutes: 47 Minutes     Therapy Time   Individual Concurrent Group Co-treatment   Time In 73         Time Out 1200         Minutes 57         Timed Code Treatment Minutes: 47 Minutes      Treatment Charges: Minutes Units   []  Ultrasound     []  Electrical-Stim     []  Iontophoresis     []  Traction     []  Massage       []  Eval     []  Gait     [x]  Ther Exercise 37  2   [x]  Manual Therapy 10 1   []  Ther Activities       []  Aquatics     []  Vasopneumatic Device     []  Neuro Re-Ed       []  Other       Total Treatment Time: 52 3           Chang Hoffmann, PTA

## 2020-12-30 ENCOUNTER — HOSPITAL ENCOUNTER (OUTPATIENT)
Dept: PHYSICAL THERAPY | Age: 75
Setting detail: THERAPIES SERIES
Discharge: HOME | End: 2020-12-30
Payer: MEDICARE

## 2020-12-30 PROCEDURE — 97110 THERAPEUTIC EXERCISES: CPT

## 2020-12-30 PROCEDURE — 97140 MANUAL THERAPY 1/> REGIONS: CPT

## 2020-12-30 ASSESSMENT — PAIN DESCRIPTION - DESCRIPTORS: DESCRIPTORS: SORE;ACHING

## 2020-12-30 ASSESSMENT — PAIN SCALES - GENERAL: PAINLEVEL_OUTOF10: 2

## 2020-12-30 ASSESSMENT — PAIN DESCRIPTION - ORIENTATION: ORIENTATION: RIGHT

## 2020-12-30 ASSESSMENT — PAIN DESCRIPTION - LOCATION: LOCATION: KNEE

## 2020-12-30 ASSESSMENT — PAIN DESCRIPTION - PAIN TYPE: TYPE: SURGICAL PAIN

## 2020-12-30 ASSESSMENT — PAIN DESCRIPTION - PROGRESSION: CLINICAL_PROGRESSION: GRADUALLY IMPROVING

## 2021-03-10 ENCOUNTER — OFFICE VISIT (OUTPATIENT)
Dept: ORTHOPEDIC SURGERY | Age: 76
End: 2021-03-10
Payer: MEDICARE

## 2021-03-10 DIAGNOSIS — Z96.651 STATUS POST TOTAL RIGHT KNEE REPLACEMENT: Primary | ICD-10-CM

## 2021-03-10 PROCEDURE — 99213 OFFICE O/P EST LOW 20 MIN: CPT | Performed by: ORTHOPAEDIC SURGERY

## 2021-03-10 PROCEDURE — G8428 CUR MEDS NOT DOCUMENT: HCPCS | Performed by: ORTHOPAEDIC SURGERY

## 2021-03-10 PROCEDURE — G8484 FLU IMMUNIZE NO ADMIN: HCPCS | Performed by: ORTHOPAEDIC SURGERY

## 2021-03-10 PROCEDURE — G8417 CALC BMI ABV UP PARAM F/U: HCPCS | Performed by: ORTHOPAEDIC SURGERY

## 2021-03-10 PROCEDURE — G8399 PT W/DXA RESULTS DOCUMENT: HCPCS | Performed by: ORTHOPAEDIC SURGERY

## 2021-03-10 PROCEDURE — 1090F PRES/ABSN URINE INCON ASSESS: CPT | Performed by: ORTHOPAEDIC SURGERY

## 2021-03-10 PROCEDURE — 1036F TOBACCO NON-USER: CPT | Performed by: ORTHOPAEDIC SURGERY

## 2021-03-10 PROCEDURE — 4040F PNEUMOC VAC/ADMIN/RCVD: CPT | Performed by: ORTHOPAEDIC SURGERY

## 2021-03-10 PROCEDURE — 1123F ACP DISCUSS/DSCN MKR DOCD: CPT | Performed by: ORTHOPAEDIC SURGERY

## 2021-03-10 NOTE — PROGRESS NOTES
Jacinto Castro M.D.            [address] [place] [address]., [address] [facility], [address] [facility]           Dept Phone: [phone]           Dept Fax:  [phone]           [place]          Dept Phone: [phone]           Dept Fax:  [phone]      Chief Compliant:  Chief Complaint   Patient presents with    Pain     Rt TKA 9/15/20        History of Present Illness: This is a 64 y.o. female who presents to the clinic today for evaluation / follow up of post right total knee 9/15/2020. She is also previous left total knee. She states that in regards to pain she is really not having much pain. She says the side is not as flexible as her other side. Otherwise she has no major complaints. .       Review of Systems   Constitutional: Negative for fever, chills, sweats. Eyes: Negative for changes in vision, or pain. HENT: Negative for ear ache, epistaxis, or sore throat. Respiratory/Cardio: Negative for Chest pain, palpitations, SOB, or cough. Gastrointestinal: Negative for abdominal pain, N/V/D. Genitourinary: Negative for dysuria, frequency, urgency, or hematuria. Neurological: Negative for headache, numbness, or weakness. Integumentary: Negative for rash, itching, laceration, or abrasion. Musculoskeletal: Positive for Pain (Rt TKA 9/15/20)       Physical Exam:  Constitutional: Patient is oriented to person, place, and time. Patient appears well-developed and well nourished. HENT: Negative otherwise noted  Head: Normocephalic and Atraumatic  Nose: Normal  Eyes: Conjunctivae and EOM are normal  Neck: Normal range of motion Neck supple. Respiratory/Cardio: Effort normal. No respiratory distress. Musculoskeletal: Examination of the patient's right knee no she does get full extension. I am able to push her back to about 115 degrees.   She gets Sexual Activity    Alcohol use: Yes     Comment: Social    Drug use: No    Sexual activity: Not on file   Lifestyle    Physical activity     Days per week: Not on file     Minutes per session: Not on file    Stress: Not on file   Relationships    Social connections     Talks on phone: Not on file     Gets together: Not on file     Attends Mandaen service: Not on file     Active member of club or organization: Not on file     Attends meetings of clubs or organizations: Not on file     Relationship status: Not on file    Intimate partner violence     Fear of current or ex partner: Not on file     Emotionally abused: Not on file     Physically abused: Not on file     Forced sexual activity: Not on file   Other Topics Concern    Not on file   Social History Narrative    Not on file     Past Medical History:   Diagnosis Date    Arthritis     Hyperlipidemia     Wears glasses      Past Surgical History:   Procedure Laterality Date    COLONOSCOPY      PRE-MALIGNANT / BENIGN SKIN LESION EXCISION Right 11/16/2015    Rt cheek cyst with local flap closure    TOTAL KNEE ARTHROPLASTY Left 2015    TOTAL KNEE ARTHROPLASTY Right 9/15/2020    KNEE TOTAL ARTHROPLASTY WITH GPS performed by Namita Shelley MD at [facility] History   Problem Relation Age of Onset    Heart Attack Mother    Plan  Patient was advised that that sometimes takes up to year to get full motion. I think that she is making good progress and basically is pretty much pain-free. Continue with her daily exercises. We will see her back in September for first year follow-up    Provider Attestation:  Rodríguez Souza, personally performed the services described in this documentation. All medical record entries made by the scribe were at my direction and in my presence. I have reviewed the chart and discharge instructions and agree that the records reflect my personal performance and is accurate and complete.  Namita Shelley MD.

## 2021-09-13 ENCOUNTER — OFFICE VISIT (OUTPATIENT)
Dept: ORTHOPEDIC SURGERY | Age: 76
End: 2021-09-13
Payer: MEDICARE

## 2021-09-13 DIAGNOSIS — Z96.651 HISTORY OF TOTAL RIGHT KNEE REPLACEMENT: Primary | ICD-10-CM

## 2021-09-13 PROCEDURE — G8421 BMI NOT CALCULATED: HCPCS | Performed by: ORTHOPAEDIC SURGERY

## 2021-09-13 PROCEDURE — 4040F PNEUMOC VAC/ADMIN/RCVD: CPT | Performed by: ORTHOPAEDIC SURGERY

## 2021-09-13 PROCEDURE — 99213 OFFICE O/P EST LOW 20 MIN: CPT | Performed by: ORTHOPAEDIC SURGERY

## 2021-09-13 PROCEDURE — G8399 PT W/DXA RESULTS DOCUMENT: HCPCS | Performed by: ORTHOPAEDIC SURGERY

## 2021-09-13 PROCEDURE — 1123F ACP DISCUSS/DSCN MKR DOCD: CPT | Performed by: ORTHOPAEDIC SURGERY

## 2021-09-13 PROCEDURE — 1090F PRES/ABSN URINE INCON ASSESS: CPT | Performed by: ORTHOPAEDIC SURGERY

## 2021-09-13 PROCEDURE — 1036F TOBACCO NON-USER: CPT | Performed by: ORTHOPAEDIC SURGERY

## 2021-09-13 PROCEDURE — G8428 CUR MEDS NOT DOCUMENT: HCPCS | Performed by: ORTHOPAEDIC SURGERY

## 2021-09-13 NOTE — PROGRESS NOTES
Enrique Huffman M.D.            [address] [place] [address]., [address] [facility], [address] [facility]           Dept Phone: [phone]           Dept Fax:  [phone]           [place]          Dept Phone: [phone]           Dept Fax:  [phone]      Chief Compliant:  Chief Complaint   Patient presents with    Pain     Rt TKA 9/15/20        History of Present Illness: This is a 64 y.o. female who presents to the clinic today for evaluation / follow up of status post right total knee September 2020 and a previous left total knee. Patient states that other than stairs she does very well sometimes she gets a catching sensation lateral aspect of her right knee only when she is going downstairs. Otherwise she is unremarkable. Review of Systems   Constitutional: Negative for fever, chills, sweats. Eyes: Negative for changes in vision, or pain. HENT: Negative for ear ache, epistaxis, or sore throat. Respiratory/Cardio: Negative for Chest pain, palpitations, SOB, or cough. Gastrointestinal: Negative for abdominal pain, N/V/D. Genitourinary: Negative for dysuria, frequency, urgency, or hematuria. Neurological: Negative for headache, numbness, or weakness. Integumentary: Negative for rash, itching, laceration, or abrasion. Musculoskeletal: Positive for Pain (Rt TKA 9/15/20)       Physical Exam:  Constitutional: Patient is oriented to person, place, and time. Patient appears well-developed and well nourished. HENT: Negative otherwise noted  Head: Normocephalic and Atraumatic  Nose: Normal  Eyes: Conjunctivae and EOM are normal  Neck: Normal range of motion Neck supple. Respiratory/Cardio: Effort normal. No respiratory distress. Musculoskeletal: Examination of patient's right knee notes her motion is 0 to 120 degrees.   She has good varus valgus stability and good patellar tracking. I do to get not intact any obvious trochanteric bursitis mobile look with a little bit of tenderness and a little bit of IT band tightness distally. I think this is a source of her pain    Examination of the patient's left knee notes her motion is better at zero 135 for good stability throughout good tracking throughout. No other contributory findings. Neurological: Patient is alert and oriented to person, place, and time. Normal strenght. No sensory deficit. Skin: Skin is warm and dry  Psychiatric: Behavior is normal. Thought content normal.  Nursing note and vitals reviewed. Labs and Imaging:     XR taken today:  XR KNEE RIGHT (1-2 VIEWS)    Result Date: 9/13/2021  Rays taken today reviewed by me show standing AP both knees allow the right knee. Patient is status post bilateral total knees. Components are in excellent position on both AP and lateral views including the lateral view of the right knee          Orders Placed This Encounter   Procedures    XR KNEE RIGHT (1-2 VIEWS)     Standing Status:   Future     Number of Occurrences:   1     Standing Expiration Date:   9/13/2022       Assessment and Plan:  1. History of total right knee replacement    2. Previous left total knee arthroplasty      This is a 64 y.o. female who presents to the clinic today for evaluation / follow up of status post bilateral total knees. Past History:    Current Outpatient Medications:     escitalopram (LEXAPRO) 5 MG tablet, Take 5 mg by mouth daily, Disp: , Rfl:     aspirin EC 81 MG EC tablet, Take 1 tablet by mouth 2 times daily, Disp: 60 tablet, Rfl: 1    amoxicillin (AMOXIL) 500 MG capsule, 2 capsules one hour prior to dental or procedure flakito't then one cap twice daily until gone, Disp: 6 capsule, Rfl: 3    simvastatin (ZOCOR) 40 MG tablet, Take 40 mg by mouth nightly., Disp: , Rfl:     alendronate (FOSAMAX) 70 MG tablet, Take 70 mg by mouth every 7 days. , Disp: , Rfl:   No Known Allergies  Social History     Socioeconomic History    Marital status:      Spouse name: Not on file    Number of children: Not on file    Years of education: Not on file    Highest education level: Not on file   Occupational History    Not on file   Tobacco Use    Smoking status: Never Smoker    Smokeless tobacco: Never Used   Vaping Use    Vaping Use: Never used   Substance and Sexual Activity    Alcohol use: Yes     Comment: Social    Drug use: No    Sexual activity: Not on file   Other Topics Concern    Not on file   Social History Narrative    Not on file     Social Determinants of Health     Financial Resource Strain:     Difficulty of Paying Living Expenses:    Food Insecurity:     Worried About Running Out of Food in the Last Year:     [address] in the Last Year:    Transportation Needs:     Lack of Transportation (Medical):      Lack of Transportation (Non-Medical):    Physical Activity:     Days of Exercise per Week:     Minutes of Exercise per Session:    Stress:     Feeling of Stress :    Social Connections:     Frequency of Communication with Friends and Family:     Frequency of Social Gatherings with Friends and Family:     Attends Jain Services:     Active Member of Clubs or Organizations:     Attends Club or Organization Meetings:     Marital Status:    Intimate Partner Violence:     Fear of Current or Ex-Partner:     Emotionally Abused:     Physically Abused:     Sexually Abused:      Past Medical History:   Diagnosis Date    Arthritis     Hyperlipidemia     Wears glasses      Past Surgical History:   Procedure Laterality Date    COLONOSCOPY      PRE-MALIGNANT / BENIGN SKIN LESION EXCISION Right 11/16/2015    Rt cheek cyst with local flap closure    TOTAL KNEE ARTHROPLASTY Left 2015    TOTAL KNEE ARTHROPLASTY Right 9/15/2020    KNEE TOTAL ARTHROPLASTY WITH GPS performed by Diana Ku MD at [facility] Problem Relation Age of Onset    Heart Attack Mother    Plan  Patient was given a home IT band stretching program as I think this will help her out significantly. If she continues to have problems consider maybe do an injection but I did she was not that symptomatic. We will see how she does with this otherwise back here 2 years time      Provider Attestation:  Alba Emmanuel, personally performed the services described in this documentation. All medical record entries made by the scribe were at my direction and in my presence. I have reviewed the chart and discharge instructions and agree that the records reflect my personal performance and is accurate and complete. Jena Tran MD. 09/13/21      Please note that this chart was generated using voice recognition Dragon dictation software. Although every effort was made to ensure the accuracy of this automated transcription, some errors in transcription may have occurred.

## 2021-12-01 ENCOUNTER — HOSPITAL ENCOUNTER (OUTPATIENT)
Dept: WOMENS IMAGING | Age: 76
Discharge: HOME | End: 2021-12-03
Payer: MEDICARE

## 2021-12-01 DIAGNOSIS — Z12.31 ENCOUNTER FOR SCREENING MAMMOGRAM FOR BREAST CANCER: ICD-10-CM

## 2021-12-01 PROCEDURE — 77067 SCR MAMMO BI INCL CAD: CPT

## 2022-12-15 ENCOUNTER — HOSPITAL ENCOUNTER (OUTPATIENT)
Dept: WOMENS IMAGING | Age: 77
Discharge: HOME | End: 2022-12-17
Payer: MEDICARE

## 2022-12-15 DIAGNOSIS — Z12.31 ENCOUNTER FOR SCREENING MAMMOGRAM FOR MALIGNANT NEOPLASM OF BREAST: ICD-10-CM

## 2022-12-15 PROCEDURE — 77067 SCR MAMMO BI INCL CAD: CPT

## 2022-12-28 ENCOUNTER — HOSPITAL ENCOUNTER (OUTPATIENT)
Dept: WOMENS IMAGING | Age: 77
Discharge: HOME | End: 2022-12-30
Payer: MEDICARE

## 2022-12-28 DIAGNOSIS — M81.0 OSTEOPOROSIS, UNSPECIFIED OSTEOPOROSIS TYPE, UNSPECIFIED PATHOLOGICAL FRACTURE PRESENCE: ICD-10-CM

## 2022-12-28 PROCEDURE — 77080 DXA BONE DENSITY AXIAL: CPT

## 2023-04-05 ENCOUNTER — TELEPHONE (OUTPATIENT)
Dept: ORTHOPEDIC SURGERY | Age: 78
End: 2023-04-05

## 2023-04-05 NOTE — TELEPHONE ENCOUNTER
Called [facility] and requested patient's recent left shoulder films to be pushed to nucleus.  CV at [facility] notified of the request.

## 2023-04-06 ENCOUNTER — OFFICE VISIT (OUTPATIENT)
Dept: ORTHOPEDIC SURGERY | Age: 78
End: 2023-04-06

## 2023-04-06 VITALS — WEIGHT: 157 LBS | HEIGHT: 62 IN | RESPIRATION RATE: 14 BRPM | BODY MASS INDEX: 28.89 KG/M2

## 2023-04-06 DIAGNOSIS — M25.512 LEFT SHOULDER PAIN, UNSPECIFIED CHRONICITY: ICD-10-CM

## 2023-04-06 DIAGNOSIS — M75.82 ROTATOR CUFF TENDINITIS, LEFT: Primary | ICD-10-CM

## 2023-04-06 RX ORDER — TRIAMCINOLONE ACETONIDE 40 MG/ML
40 INJECTION, SUSPENSION INTRA-ARTICULAR; INTRAMUSCULAR ONCE
Status: COMPLETED | OUTPATIENT
Start: 2023-04-06 | End: 2023-04-06

## 2023-04-06 RX ORDER — LIDOCAINE HYDROCHLORIDE 10 MG/ML
3 INJECTION, SOLUTION INFILTRATION; PERINEURAL ONCE
Status: COMPLETED | OUTPATIENT
Start: 2023-04-06 | End: 2023-04-06

## 2023-04-06 RX ADMIN — LIDOCAINE HYDROCHLORIDE 3 ML: 10 INJECTION, SOLUTION INFILTRATION; PERINEURAL at 09:52

## 2023-04-06 RX ADMIN — TRIAMCINOLONE ACETONIDE 40 MG: 40 INJECTION, SUSPENSION INTRA-ARTICULAR; INTRAMUSCULAR at 09:52

## 2023-04-06 NOTE — PROGRESS NOTES
Orthopedic Shoulder Encounter Note     Chief complaint: left shoulder pain    HPI: Lila Camacho is a [de-identified] y.o.  right-hand dominant female who presents for evaluation of her left shoulder. She indicates that she has been having pain for about 7 months now. She states that she lifted a heavy suitcase at the time and felt something stretch in her shoulder. She was able to rest her shoulder for a while and return to her regular activities gradually. However in the past months she is experienced pain in her shoulder that has increased in intensity. It tends to fluctuate in intensity and is intermittent in nature. It is primarily localized to the lateral aspect of her shoulder. She describes it as a tooth ache at rest but becomes quite severe with movement and use. She denies having any weakness or stiffness. She was treated with prednisone for 5 days by her PCP and this did provide significant pain relief but her pain returned to baseline as soon as she was done with the course. Previous treatment:    NSAIDs: None    Physical Therapy: No    Injections: None    Surgeries: None    Review of Systems:   Constitutional: Negative for fever, chills, sweats. Pain Score:  10 - Worst pain ever  Neurological: Negative for headache, numbness, or weakness. Musculoskeletal: As noted in HPI     Past Medical History  Sowmya Nurse  has a past medical history of Arthritis, Hyperlipidemia, and Wears glasses. Past Surgical History  Sowmya Nurse  has a past surgical history that includes Total knee arthroplasty (Left, 2015); pre-malignant / benign skin lesion excision (Right, 11/16/2015); Colonoscopy; and Total knee arthroplasty (Right, 9/15/2020).     Current Medications  Current Outpatient Medications   Medication Sig Dispense Refill    escitalopram (LEXAPRO) 5 MG tablet Take 5 mg by mouth daily      aspirin EC 81 MG EC tablet Take 1 tablet by mouth 2 times daily 60 tablet 1    amoxicillin (AMOXIL) 500 MG capsule 2 capsules one hour

## 2023-04-18 ENCOUNTER — HOSPITAL ENCOUNTER (OUTPATIENT)
Dept: PHYSICAL THERAPY | Age: 78
Setting detail: THERAPIES SERIES
Discharge: HOME | End: 2023-04-18
Payer: MEDICARE

## 2023-04-18 PROCEDURE — 97161 PT EVAL LOW COMPLEX 20 MIN: CPT

## 2023-04-20 NOTE — PROGRESS NOTES
[facility] Fall Risk Assessment    Name: Sherrie Nando  Risk Factor Scale  Score   History of Falls [] Yes  [x] No 25  0 0   Secondary Diagnosis [] Yes  [x] No 15  0 0   Ambulatory Aid [] Furniture  [] Crutches/cane/walker  [x] None/bedrest/wheelchair/nurse 30  15  0 0   IV/Heparin Lock [] Yes  [x] No 20  0 0   Gait/Transferring [] Impaired  [] Weak  [x] Normal/bedrest/immobile 20  10  0 0   Mental Status [] Forgets limitations  [x] Oriented to own ability 15  0 0      Total:  0     Based on the Assessment score: check the appropriate box.     [x]  No intervention needed   Low =   Score of 0-24    []  Use standard prevention interventions Moderate =  Score of 24-44   [] Give patient handout and discuss fall prevention strategies   [] Establish goal of education for patient/family RE: fall prevention strategies    []  Use high risk prevention interventions High = Score of 45 and higher   [] Give patient handout and discuss fall prevention strategies   [] Establish goal of education for patient/family Re: fall prevention strategies   [] Discuss lifeline / other resources    Electronically signed by:   Adelaida Perez PT DPT  Date: 4/18/2023
3/10 with pain medication   Worst: 5/10 with pain medication    Best:  2/10 with pain medication   Symptoms:  [x] Improving [] Worsening       [] Same  Descriptors: constant, aching   Aggravating factors: OH reaching, lifting, carrying   Relieving factors: Rest, Repositioning   Sleep: Disturbed - cannot lay on the (L) shoulder   Other:     Objective  Observation/Palpation   Normal Deficit Comments   Observation [x] [] No abnormal alignment of scapula   Posture  [] [x] Forward head, rounded shoulders kyphosis - moderate to severe,    Palpation [] [x] Tender to the touch throughout the (L) shoulder joint    Edema [] []    Scar [] []    Other [] []      Range of Motion  Spine - Range of Motion  Cervical  WNL in all planes   Thoracic  WNL in all planes     Scapula - Active ROM  Elevation  WNL   Retraction  WNL   Depression  WNL   Protraction  WNL     Right Upper Extremity - Passive ROM  Shoulder flexion  WNL  Shoulder extension  WNL   Shoulder abduction  WNL   Shoulder IR  WNL   Shoulder ER  WNL   Elbow flexion  WNL   Elbow extension  WNL   Forearm supination  WNL   Forearm pronation  WNL   Wrist flexion  WNL   Wrist extension  WNL     Left Upper Extremity - Active ROM  Shoulder flexion  WNL   Shoulder extension  WNL   Shoulder abduction  WNL   Shoulder IR  WNL   Shoulder ER  WNL   Elbow flexion  WNL   Elbow extension  WNL   Forearm supination  WNL   Forearm pronation  WNL   Wrist flexion  WNL   Wrist extension  WNL     Left Upper Extremity - Passive ROM  Shoulder flexion  WNL   Shoulder extension  WNL   Shoulder abduction  WNL   Shoulder IR  WNL   Shoulder ER  WNL   Elbow flexion  WNL   Elbow extension  WNL   Forearm supination  WNL   Forearm pronation  WNL   Wrist flexion  WNL   Wrist extension  WNL     Strength  Right Upper Extremity - Strength  Shoulder flexion  5  Shoulder extension  5  Shoulder abduction  5  Shoulder IR  5  Shoulder ER  5  Elbow flexion  5  Elbow extension  5  Forearm supination  5  Forearm

## 2023-04-27 ENCOUNTER — HOSPITAL ENCOUNTER (OUTPATIENT)
Dept: PHYSICAL THERAPY | Age: 78
Setting detail: THERAPIES SERIES
Discharge: HOME | End: 2023-04-27
Payer: MEDICARE

## 2023-04-27 PROCEDURE — 97110 THERAPEUTIC EXERCISES: CPT

## 2023-04-27 NOTE — PROGRESS NOTES
[facility]) [facility]  [address]  Phone: [phone]  Fax: [phone]    Physical Therapy Daily Treatment Note    Date:  2023  Patient: Lila Camacho  : 1945  MRN: [id number]   Physician: Sd Gaston MD                                 Insurance: [employer] (8 visits from 2023 - 2023)  Medical Diagnosis: Rotator cuff tendinitis, left (M75.82)  Evaluate and treat left shoulder . Modalities as needed. 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   2-3 times a week for 4-6 weeks. Rehab Codes: (L) shoulder pain (M25.512) with muscle weakness (M62.81)  Onset Date: 2023                    Next 's appt: TBD  Visit Count:                                  Cancel/No Show: 0/0    Comment:   Authorization was obtained following the evaluation. 8 visits were authorized from 2023 -2023)    Subjective  Patient stated that her symptoms have completely subsided since her initial evaluation and she has been relatively pain-free over the last 24 hours. Pain:  [] Yes   [x] No      Location: (L) shoulder joint   Pain Ratin/10 with pain medication   Worst: 0/10 with pain medication   Best: 0/10 with pain medication  Descriptors: Other:     Objective  Modalities:   Precautions:   Exercises:  Exercise Reps/ Time Weight/ Level Comments   Supine (L) shoulder protraction  10 reps x 3 sets  5 lbs    Side lying (L) shoulder ER with a towel  10 reps x 3 sets   AAROM   Prone (L) shoulder extension with thumb in  10 reps x 3 sets      Prone (L) shoulder rows  10 reps x 3 sets     Passive Stretching   Supine (L) pect major stretch @ 90 and 120 degrees 30 sec hold x 3 reps with each angle   Supine (L) upper trap stretch 30 sec hold x 3 reps     Pt. Education:  [] Yes  [] No  [] Reviewed Prior HEP/Ed  Method of Education: [] Verbal  [] Demo  [] Written  HEP:   Comprehension of Education:  [] Verbalizes understanding.   [] Demonstrates

## 2023-05-09 ENCOUNTER — HOSPITAL ENCOUNTER (OUTPATIENT)
Dept: PHYSICAL THERAPY | Age: 78
Setting detail: THERAPIES SERIES
Discharge: HOME | End: 2023-05-09
Payer: MEDICARE

## 2023-05-09 PROCEDURE — 97110 THERAPEUTIC EXERCISES: CPT

## 2023-05-09 NOTE — PROGRESS NOTES
Traction  [id number]   [] Aquatic Therapy   [id number] [x] Cold/hotpack    [] Massage   [id number]      [] Dry Needling, 1 or 2 muscles  42422   [] Biofeedback, first 15 minutes   16550  [] Biofeedback, additional 15 minutes   60100 [] Dry Needling, 3 or more muscles  11351       Todays Treatment:  Access Code: [email or web address]  URL: [email or web address]  Date: 05/09/2023  Prepared by: Irasema Ruiz    Exercises (Home Exercise Program)  - Doorway Pec Stretch at 90 Elevation with Arm Straight  - 1 x daily - 7 x weekly - 3 sets - 30 sec hold  - Doorway Pec Stretch at 120 Elevation with Arm Straight  - 1 x daily - 7 x weekly - 3 sets - 30 sec hold  Other:    Treatment Charges: Mins Units   [] Evaluation       []  Low       []  Moderate       []  High     []  Modalities     [x]  Ther Exercise 15 1   []  Manual Therapy     []  Ther Activities     []  Aquatics     []  Neuromuscular     []  Gait Training     []  Dry Needling           1-2 muscles     []  Dry Needling           3 or more muscles     [] Vasocompression     [x]  Other 15 0     TOTAL TREATMENT TIME: 30 minutes    Time in: 1000    Time Out: 1030    Electronically signed by: Beatriz Campbell PT DPT    Physician Signature:________________________________Date:__________________  By signing above or cosigning this note, I have reviewed this plan of care and certify a need for medically necessary rehabilitation services.      *PLEASE SIGN ABOVE AND FAX BACK ALL PAGES*

## 2023-09-27 ENCOUNTER — OFFICE VISIT (OUTPATIENT)
Dept: ORTHOPEDIC SURGERY | Age: 78
End: 2023-09-27
Payer: MEDICARE

## 2023-09-27 DIAGNOSIS — M25.561 RIGHT KNEE PAIN, UNSPECIFIED CHRONICITY: Primary | ICD-10-CM

## 2023-09-27 PROCEDURE — 1123F ACP DISCUSS/DSCN MKR DOCD: CPT | Performed by: ORTHOPAEDIC SURGERY

## 2023-09-27 PROCEDURE — 99213 OFFICE O/P EST LOW 20 MIN: CPT | Performed by: ORTHOPAEDIC SURGERY

## 2023-09-27 NOTE — PROGRESS NOTES
Alejandro Glasgow M.D.            [address] [facility], [address] [facility], [address] [place] [address] [address]           Dept Phone: [phone]           Dept Fax:  [address] [facility], [address] [facility]          Dept Phone: [phone]           Dept Fax:  [phone]      Chief Compliant:  Chief Complaint   Patient presents with    Pain     RT KNEE        History of Present Illness: This is a 78 y.o. female who presents to the clinic today for evaluation / follow up of bilateral total knees. Patient states that overall she is doing well she is ambulating able ambulate well and get around well. She says her right knee sometimes feels like it catches a little bit when she arises out of chairs or take something to get her going. She states she is never had the same amount of flexion as she has on the left knee. Review of Systems   Constitutional: Negative for fever, chills, sweats. Eyes: Negative for changes in vision, or pain. HENT: Negative for ear ache, epistaxis, or sore throat. Respiratory/Cardio: Negative for Chest pain, palpitations, SOB, or cough. Gastrointestinal: Negative for abdominal pain, N/V/D. Genitourinary: Negative for dysuria, frequency, urgency, or hematuria. Neurological: Negative for headache, numbness, or weakness. Integumentary: Negative for rash, itching, laceration, or abrasion. Musculoskeletal: Positive for Pain (RT KNEE)       Physical Exam:  Constitutional: Patient is oriented to person, place, and time. Patient appears well-developed and well nourished. HENT: Negative otherwise noted  Head: Normocephalic and Atraumatic  Nose: Normal  Eyes: Conjunctivae and EOM are normal  Neck: Normal range of motion Neck supple. Respiratory/Cardio: Effort normal. No respiratory distress.   Musculoskeletal: Examination of patient's right total knee

## 2025-02-04 ENCOUNTER — HOSPITAL ENCOUNTER (OUTPATIENT)
Dept: WOMENS IMAGING | Age: 80
Discharge: HOME | End: 2025-02-06
Payer: MEDICARE

## 2025-02-04 DIAGNOSIS — Z12.31 ENCOUNTER FOR SCREENING MAMMOGRAM FOR BREAST CANCER: ICD-10-CM

## 2025-02-04 PROCEDURE — 77067 SCR MAMMO BI INCL CAD: CPT

## (undated) DEVICE — SUTURE STRATAFIX SYMMETRIC PDS + SZ 1 L18IN ABSRB VLT L48MM SXPP1A400

## (undated) DEVICE — CEMENT MIXING SYSTEM WITH FEMORAL BREAKWAY NOZZLE: Brand: REVOLUTION

## (undated) DEVICE — BANDAGE,SELF ADHRNT,COFLEX,4"X5YD,STRL: Brand: COLABEL

## (undated) DEVICE — BLANKET WRM W29.9XL79.1IN UP BODY FORC AIR MISTRAL-AIR

## (undated) DEVICE — DRESSING PETRO W3XL8IN OIL EMUL N ADH GZ KNIT IMPREG CELOS

## (undated) DEVICE — Device

## (undated) DEVICE — 4-PORT MANIFOLD: Brand: NEPTUNE 2

## (undated) DEVICE — MERCY HEALTH ST CHARLES: Brand: MEDLINE INDUSTRIES, INC.

## (undated) DEVICE — COOLER THER 20-31IN L CRYO COMB W/ PD KNEE TB GRAV FLO

## (undated) DEVICE — KIT SEP W/ BLD DRAW TB SYR NDL TRNQT PD

## (undated) DEVICE — DUAL CUT SAGITTAL BLADE

## (undated) DEVICE — GLOVE ORANGE PI 8 1/2   MSG9085

## (undated) DEVICE — SOLUTION IV IRRIG WATER 1000ML POUR BRL 2F7114

## (undated) DEVICE — DRESSING ALGINATE POST OPERATIVE 12X3.5 IN RECT PRIMASEAL

## (undated) DEVICE — SUTURE STRATAFIX SPRL MCRYL + SZ 2 0 L27IN ABSRB UD W NDL SXMP1B419

## (undated) DEVICE — SOLUTION IV IRRIG POUR BRL 0.9% SODIUM CHL 2F7124

## (undated) DEVICE — CLOSURE SKIN FLX NONINVASIVE PRELOC TECHNOLOGY FOR 24IN

## (undated) DEVICE — GLOVE ORTHO 8   MSG9480

## (undated) DEVICE — Z INACTIVE USE 2660664 SOLUTION IRRIG 3000ML 0.9% SOD CHL USP UROMATIC PLAS CONT

## (undated) DEVICE — MASTISOL ADHESIVE LIQ 2/3ML

## (undated) DEVICE — KIT AUTOTRNS APPL AERO 2 SET SYR 2 TIP FOR PLT SEP SYS GPS

## (undated) DEVICE — 2108 SERIES SAGITTAL BLADE FLARED, GROUND  (29.0 X 1.32 X 84.0MM)

## (undated) DEVICE — 450 ML BOTTLE OF 0.05% CHLORHEXIDINE GLUCONATE IN 99.95% STERILE WATER FOR IRRIGATION, USP AND APPLICATOR.: Brand: IRRISEPT ANTIMICROBIAL WOUND LAVAGE

## (undated) DEVICE — ELECTRODE ES L3IN S STL BLDE INSUL DISP VALLEYLAB EDGE

## (undated) DEVICE — SUTURE VCRL SZ 0 L36IN ABSRB UD CT-1 L36MM 1/2 CIR TAPR PNT VCP946H

## (undated) DEVICE — INTENDED TO SUPPORT AND MAINTAIN THE POSITION OF AN ANESTHETIZED PATIENT DURING SURGERY: Brand: HERMANTOR XL PINK KNEE POSITIONING PAD